# Patient Record
Sex: MALE | Race: WHITE | NOT HISPANIC OR LATINO | Employment: FULL TIME | URBAN - METROPOLITAN AREA
[De-identification: names, ages, dates, MRNs, and addresses within clinical notes are randomized per-mention and may not be internally consistent; named-entity substitution may affect disease eponyms.]

---

## 2017-03-28 ENCOUNTER — NUTRITION (OUTPATIENT)
Dept: DIABETES | Facility: CLINIC | Age: 62
End: 2017-03-28
Payer: COMMERCIAL

## 2017-03-28 VITALS — BODY MASS INDEX: 32.59 KG/M2 | WEIGHT: 276 LBS | HEIGHT: 77 IN

## 2017-03-28 DIAGNOSIS — Z71.3 NUTRITIONAL COUNSELING: Primary | ICD-10-CM

## 2017-03-28 PROCEDURE — 99999 PR PBB SHADOW E&M-EST. PATIENT-LVL III: CPT | Mod: PBBFAC,,, | Performed by: DIETITIAN, REGISTERED

## 2017-03-28 NOTE — MR AVS SNAPSHOT
"    [facility]  [address] [facility] [place] [address]  Phone: [phone]  Fax: [phone]                  Donavan Castillo   3/28/2017 3:30 PM   Nutrition    Description:  Male : 1955   Provider:  Yareli Carballo RD, CDE   Department:  [facility]           Reason for Visit     Ideal Protein           Diagnoses this Visit        Comments    Nutritional counseling    -  Primary            To Do List           Goals (5 Years of Data)     None      Follow-Up and Disposition     Return in about 3 months (around 2017), or RD 3mos.      [facility]     [facility] On Call Nurse Care Line -  Assistance  Registered nurses in the [facility] On Call Center provide clinical advisement, health education, appointment booking, and other advisory services.  Call for this free service at [phone].             Medications           STOP taking these medications     diltiazem (CARDIZEM) 120 MG tablet Take 240 mg by mouth.     tamsulosin (FLOMAX) 0.4 mg Cp24 0.4 mg.           Verify that the below list of medications is an accurate representation of the medications you are currently taking.  If none reported, the list may be blank. If incorrect, please contact your healthcare provider. Carry this list with you in case of emergency.           Current Medications     aspirin 325 MG tablet Take 325 mg by mouth.    diltiaZEM (CARDIZEM CD) 240 MG 24 hr capsule TAKE 1 CAPSULE BY MOUTH DAILY AT BEDTIME    flecainide (TAMBOCOR) 100 MG Tab Take 150 mg by mouth every 12 (twelve) hours.     ibuprofen 200 mg Cap Take 200 mg by mouth.    oxybutynin (DITROPAN XL) 10 MG 24 hr tablet Take 10 mg by mouth.           Clinical Reference Information           Your Vitals Were     Height Weight BMI          6' 5" (1.956 m) 125.2 kg (276 lb 0.3 oz) 32.73 kg/m2        Allergies as of 3/28/2017     No Known Allergies      Immunizations Administered on Date of Encounter - 3/28/2017     None    "   [facility] Sign-Up     Activating your [facility] account is as easy as 1-2-3!     1) Visit [email or web address], select Sign Up Now, enter this activation code and your date of birth, then select Next.  [email or web address]  Expires: 5/12/2017  3:47 PM      2) Create a username and password to use when you visit [facility] in the future and select a security question in case you lose your password and select Next.    3) Enter your e-mail address and click Sign Up!    Additional Information  If you have questions, please e-mail [email or web address] or call [phone] to talk to our [facility] staff. Remember, [facility] is NOT to be used for urgent needs. For medical emergencies, dial 911.         Language Assistance Services     ATTENTION: Language assistance services are available, free of charge. Please call [phone].      ATENCIÓN: Si habla iona, tiene a pace disposición servicios gratuitos de asistencia lingüística. Llame al [phone].     CHÚ Ý: N?u b?n nói Ti?ng Vi?t, có các d?ch v? h? tr? ngôn ng? mi?n phí dành cho b?n. G?i s? [phone].         [facility] complies with applicable Federal civil rights laws and does not discriminate on the basis of race, color, national origin, age, disability, or sex.

## 2017-03-28 NOTE — PROGRESS NOTES
IDEAL PROTEIN PROTOCOL  Weight Management Program     CC: Ideal Protein Protocol FU - initiate level 4 maintenance  PRIMARY PROVIDER:  Primary Doctor No   REFERRING PROVIDER: No ref. provider found     MEASUREMENTS: Reviewed from MA vitals.  BODY COMPOSITION: Reviewed from MA vitals.    GENERAL BODY FAT %:   CLASSIFICATION------WOMEN------MEN  Essential Fat----------------10-12%--------2-4%  Athletes--------------------14-20%--------6-13%  Fitness----------------------21-24%-------14-17%  Acceptable-----------------25-31%-------18-25%  At Risk---------------------->32%---------->25%    ROS:   Constipation: none   Hunger:  none   Weakness:  none   Fatigue: none   Dizziness:  none   Nausea: none     SELF-MONITORING: Glucometer - n/a; Food - none are avail    ACTIVITY LEVEL: biking couple days per week - inconsistent freq    NUTRITION RECALL:  Intake ~2200 cals/d w/ excess refined carb, fat and sodium from processed foods, dining out. Inadequate fruit, non-starchy vegetables, whole grains.  Bfst: cereal (raisin bran)  Lunch: 2pc pizza  Snack: lindsey's bar    Dinner: bbq pork on bun w/ chips  Nathalie: water, coffee    PSYCHOSOCIAL: Stage of change - preparation; Barriers to change - environmental, readiness    MNT ASSESSMENT:   1800 cals per day, 6-8oz protein per day, 195 grams carb per day  30-45 grm carb/meal, 15-30 grm carb/snack  Low-fat  150 min exercise per week    PLAN:   Reviewed MNT guidelines for obesity.    Encouraged daily self-monitoring of food and activity patterns, return records to clinic. Listed Origin[employer] barrett w/ discussion on benefits and applications to long-term wt maintenance.    Provided meal-planning instruction via foodlists, plate method, food models. Reviewed micro/macronutrient effect on health management. Discussed carbohydrate counting and spacing techniques with emphasis on cardiovascular wellness health strategies, functional foods. Reviewed principles of energy metabolism to assist  weight and health management. Encouraged pt to submit Ideal Protein food orders for ongoing progress.    Discussed importance of daily physical activity with review of benefits, methods, guidelines and precautions.    Discussed behavioral strategies for improving social and environmental support of lifestyle changes.    GOALS: Self-monitoring: Daily food & activity journal. Meal Plan: 90% Accuracy. Activity:150 min/wk.    Visit Time Spent:  30 minutes  Thank you for the opportunity to work with your patient.

## 2017-07-11 ENCOUNTER — NUTRITION (OUTPATIENT)
Dept: DIABETES | Facility: CLINIC | Age: 62
End: 2017-07-11
Payer: COMMERCIAL

## 2017-07-11 VITALS — BODY MASS INDEX: 33.53 KG/M2 | WEIGHT: 283.94 LBS | HEIGHT: 77 IN

## 2017-07-11 DIAGNOSIS — Z71.3 NUTRITIONAL COUNSELING: Primary | ICD-10-CM

## 2017-07-11 PROCEDURE — 99999 PR PBB SHADOW E&M-EST. PATIENT-LVL III: CPT | Mod: PBBFAC,,, | Performed by: DIETITIAN, REGISTERED

## 2017-07-11 NOTE — PROGRESS NOTES
IDEAL PROTEIN PROTOCOL  Weight Management Program     CC: Ideal Protein Protocol FU - Pt with ~20 lb wt gain since prior yr.     PRIMARY PROVIDER:  Primary Doctor No   REFERRING PROVIDER: No ref. provider found     MEASUREMENTS: Reviewed from MA Flying Pig Digitals.  BODY COMPOSITION: Reviewed from MA Flying Pig Digitals.    GENERAL BODY FAT %:   CLASSIFICATION------WOMEN------MEN  Essential Fat----------------10-12%--------2-4%  Athletes--------------------14-20%--------6-13%  Fitness----------------------21-24%-------14-17%  Acceptable-----------------25-31%-------18-25%  At Risk---------------------->32%---------->25%    SELF-MONITORING: none    ACTIVITY LEVEL: none regular, plans to start bike riding and elliptical with wife in evenings after work    NUTRITION RECALL:  Intake ~2600-3000cals/d w/ excess carb, fat and sodium from processed foods (snacks), dining out, portions and daily wine intake (~10-12 oz). Inadequate volume fruit, non-starchy vegetables, whole grains.    PSYCHOSOCIAL: Stage of change - lapses; Barriers to change - environmental, readiness.    MNT ASSESSMENT:   2657-2649 cals,  grm protein daily   Low-fat  150 min exercise per week    PLAN:   Reviewed MNT guidelines for obesity and long-term weight management.   Encouraged daily self-monitoring of food and activity patterns via Center'd or Revokomnesspal. Discussed importance of setting weight target and daily/weekly monitoring to improve awareness of progress.    Provided meal-planning instruction. Reviewed micro/macronutrient effect on health management. Discussed carbohydrate counting and spacing techniques with emphasis on cardiovascular wellness health strategies, functional foods. Reviewed principles of energy metabolism to assist weight and health management. Provided additional meal replacement product options. Emphasis on higher protein intake at lunch (6-8oz prot with 2+cups non-starchy vegetables) to assist with satiety and reduce grazing snack pattern  in pm. Emphasis on caloric intake of ethol and amt cals to gain 1lb per week.     Discussed importance of daily physical activity with review of benefits, methods, guidelines and precautions. Encouraged plan and consistency.     Discussed behavioral strategies for improving social and environmental support of lifestyle changes.    GOALS: Self-monitoring: Daily food & activity journal. Meal Plan: 90% Accuracy. Activity:150 min/wk.    Visit Time Spent:  30 minutes  Thank you for the opportunity to work with your patient.

## 2017-09-14 ENCOUNTER — NUTRITION (OUTPATIENT)
Dept: NUTRITION | Facility: CLINIC | Age: 62
End: 2017-09-14

## 2017-09-14 VITALS — BODY MASS INDEX: 33.36 KG/M2 | WEIGHT: 281.31 LBS

## 2020-08-06 ENCOUNTER — OFFICE VISIT (OUTPATIENT)
Dept: PSYCHIATRY | Facility: CLINIC | Age: 65
End: 2020-08-06
Payer: COMMERCIAL

## 2020-08-06 DIAGNOSIS — F34.1 DYSTHYMIA: Primary | ICD-10-CM

## 2020-08-06 DIAGNOSIS — R41.840 INATTENTION: ICD-10-CM

## 2020-08-06 PROCEDURE — 90792 PR PSYCHIATRIC DIAGNOSTIC EVALUATION W/MEDICAL SERVICES: ICD-10-PCS | Mod: S$GLB,,, | Performed by: PSYCHIATRY & NEUROLOGY

## 2020-08-06 PROCEDURE — 99999 PR PBB SHADOW E&M-EST. PATIENT-LVL I: ICD-10-PCS | Mod: PBBFAC,,, | Performed by: PSYCHIATRY & NEUROLOGY

## 2020-08-06 PROCEDURE — 90792 PSYCH DIAG EVAL W/MED SRVCS: CPT | Mod: S$GLB,,, | Performed by: PSYCHIATRY & NEUROLOGY

## 2020-08-06 PROCEDURE — 99999 PR PBB SHADOW E&M-EST. PATIENT-LVL I: CPT | Mod: PBBFAC,,, | Performed by: PSYCHIATRY & NEUROLOGY

## 2020-08-06 RX ORDER — ATOMOXETINE 40 MG/1
CAPSULE ORAL
Qty: 60 CAPSULE | Refills: 2 | Status: SHIPPED | OUTPATIENT
Start: 2020-08-06 | End: 2020-08-07 | Stop reason: SDUPTHER

## 2020-08-06 NOTE — PROGRESS NOTES
"Outpatient Psychiatry Initial Visit (MD/NP)    2020    Donavan Castillo, a 65 y.o. male, presenting for initial evaluation visit. Met with patient.    Reason for Encounter: Patient complains of chronic inattention.     History of Present Illness: Patient is a 66 y/o M with previous reported treatment for adhd, treated with stimulants since his mid-40's. Diagnosed with adhd by psychiatrist Kamari Wilson, previously treated with stimulants for many years. Reports chronic inattention interferes were personal function    Interferes with personal (marital conflict due to chronic problems listening, missing information from his wife) and occupational functioning (difficulty completing projects, being inefficient with his performance). Describes difficulty with multi-tasking, being easily distractible, not attending consistently through conversations. Thinks it may have gotten worse over past 10 years. Denies presence of problems with inattention during school-age years. Denies problems with anxiety, depression, generally, though frustrated with this particular problem. Participating in couples therapy.     Some days:   Low mood  Sleep problems  Self-reproach  Decreased interest     Most days:   Low appetite  Concentration problems    Nearly daily:   Anergia  Psychomotor slowing    PHQ-9 = 14    ASRS - 7 of 18 in clinical range    MOCA - 26 (point off for not completing one final edge of 3-d cube and 3 words not recalled on MOCA, though was able to recall 2/3 with cues).     Family Hx: "lots of eccentric people". Brother ( in '02) - severe OCD.  of overdose (suspected intentional). Sister, 2 years younger, "a hoarder". "the rest of us are functional but eccentric".     Psych Hx:   first problems with attention/concentration 15 years ago. Causes problems with completing tasks. Mostly stable over that time. Sometimes "walk into a room and realize I've forgotten what I went for".     diagnosed about 15 years ago. " "Diagnosed by Dr. Wilson.     Ritalin - worked well - took for a couple of years. 5 mg qid  adderall - didn't tolerate (mood swings)  Stopped due to atrial fibrillation - has had it on/off since , usually well-managed with medication, though has had cardioversion 2-3 times. Last cardioversion was in February and was about to consent to ablation, but got reluctant when COVID arrived.     Reports moods are sometimes low, never suicidal. Never treated for depression.   No rona - "a low energy person my whole life"  AVH - none  No delusions  No psych hospitalizations  Briefly took an antidepressant in  - prescribed by cardiologist - stopped due to no perceived benefit.       Medical Hx:   HTN  A-fib - managed with fleccanide  BPH - takes finasteride  mobic for joint pain.     Social Hx: 5 siblings, brother who  in '02, sister with problem with hoarding, another sister, 2 brothers (one in , one in [place]). Oldest of the 6. Both parents were in the home as were his maternal grandparents. Grew up in [place]. Childhood was a happy. Denies serious maltreatment. No developmental problems. Denies problems with attention & concentration as a kid. 2nd in class in . Went to [employer], studied pre-med, but didn't get accepted. 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 Continues to work full-time.      at 24 x 17 years. She had bipolar disorder.  again at 43 to second wife. Marriage is "stefano". Attention problem contributes to problems ("I don't listen the way she wants me too").        Review Of Systems:     GENERAL:  No weight gain or loss  SKIN:  No rashes or lacerations  HEAD:  No headaches  EYES:  No exophthalmos, jaundice or blindness  EARS:  No dizziness, tinnitus or hearing loss  NOSE:  No changes in smell  MOUTH & THROAT:  No dyskinetic movements or obvious goiter  CHEST:  No shortness of breath, " hyperventilation or cough  CARDIOVASCULAR:  No tachycardia or chest pain  ABDOMEN:  No nausea, vomiting, pain, constipation or diarrhea  URINARY:  No frequency, dysuria or sexual dysfunction  ENDOCRINE:  No polydipsia, polyuria  MUSCULOSKELETAL:  No pain or stiffness of the joints  NEUROLOGIC:  No weakness, sensory changes, seizures, confusion, memory loss, tremor or other abnormal movements    Current Evaluation:     Nutritional Screening: Considering the patient's height and weight, medications, medical history and preferences, should a referral be made to the dietitian? no    Constitutional  Vitals:  Most recent vital signs, dated less than 90 days prior to this appointment, were not reviewed.       General:  unremarkable, age appropriate     Musculoskeletal  Muscle Strength/Tone:  no tremor, no tic   Gait & Station:  non-ataxic     Psychiatric  Appearance: casually dressed & groomed;   Behavior: calm,   Cooperation: cooperative with assessment  Speech: normal rate, volume, tone  Thought Process: linear, goal-directed  Thought Content: No suicidal or homicidal ideation; no delusions  Affect: normal range  Mood: euthymic  Perceptions: No auditory or visual hallucinations  Level of Consciousness: alert throughout interview  Insight: fair  Cognition: Oriented to person, place, time, & situation  Memory: no apparent deficits to general clinical interview; not formally assessed  Attention/Concentration: no apparent deficits to general clinical interview; not formally assessed  Fund of Knowledge: average by vocabulary/education    Laboratory Data  No visits with results within 1 Month(s) from this visit.   Latest known visit with results is:   No results found for any previous visit.       Medications  Outpatient Encounter Medications as of 8/6/2020   Medication Sig Dispense Refill    aspirin 325 MG tablet Take 325 mg by mouth.      diltiaZEM (CARDIZEM CD) 240 MG 24 hr capsule TAKE 1 CAPSULE BY MOUTH DAILY AT BEDTIME       flecainide (TAMBOCOR) 100 MG Tab Take 150 mg by mouth every 12 (twelve) hours.       ibuprofen 200 mg Cap Take 200 mg by mouth.      oxybutynin (DITROPAN XL) 10 MG 24 hr tablet Take 15 mg by mouth.        No facility-administered encounter medications on file as of 8/6/2020.        Assessment - Diagnosis - Goals:     Impression: 64 y/o M with reportedly stable inattention, treated effectively with stimulants chronically. Has chronic mildly low moods, anergia,     DX: dysthymia; inattention    Treatment Goals:  Specify outcomes written in observable, behavioral terms: improve moods by self       Treatment Plan/Recommendations:   · Trial of strattera.   · Discussed risks, benefits, and alternatives to treatment plan documented above with patient. I answered all patient questions related to this plan and patient expressed understanding and agreement.      Return to Clinic: 2 months    Counseling time: 10 minutes  Total time: 50 minutes    CHARLOTTE Langston MD  Psychiatry  [facility]  [address] , [place] [address]  [phone]

## 2020-08-07 RX ORDER — ATOMOXETINE 40 MG/1
CAPSULE ORAL
Qty: 60 CAPSULE | Refills: 2 | Status: SHIPPED | OUTPATIENT
Start: 2020-08-07 | End: 2020-10-12

## 2020-08-10 ENCOUNTER — TELEPHONE (OUTPATIENT)
Dept: PHARMACY | Facility: CLINIC | Age: 65
End: 2020-08-10

## 2020-08-10 NOTE — TELEPHONE ENCOUNTER
The prior authorization for Donavan Ortega has been submitted on 8/10/2020 @ 3:42pm.  It can take up to 72 hours for a decision to be rendered from the insurance.  Patient is aware of PA and process.  Please let me know if you have any questions.    Thank You!   Nikkie Bazan CPhT, B.A  Patient Care Advocate   [facility]  Phone: [phone] Ext [phone]  Fax: [phone]

## 2020-08-10 NOTE — TELEPHONE ENCOUNTER
Good Afternoon,     The prior authorization for Donavan Castillo's Strattera prescription has been APPROVED FROM 8/10/2020 TO 8/10/2023 with copayment of $10.00.       Patient has been notified of the decision on 8/10/2020 and patient states he will  medicationat [facility] today.    If there are any additional questions or concerns, please contact me.    Thank You!   Nikkie Bazan CPhT, B.A  Patient Care Advocate   [facility]  Phone: [phone] Ext [phone]  Fax: [phone]

## 2020-10-12 ENCOUNTER — OFFICE VISIT (OUTPATIENT)
Dept: PSYCHIATRY | Facility: CLINIC | Age: 65
End: 2020-10-12
Payer: COMMERCIAL

## 2020-10-12 DIAGNOSIS — F34.1 DYSTHYMIA: Primary | ICD-10-CM

## 2020-10-12 DIAGNOSIS — R41.840 INATTENTION: ICD-10-CM

## 2020-10-12 PROCEDURE — 1159F MED LIST DOCD IN RCRD: CPT | Mod: S$GLB,,, | Performed by: PSYCHIATRY & NEUROLOGY

## 2020-10-12 PROCEDURE — 99999 PR PBB SHADOW E&M-EST. PATIENT-LVL I: ICD-10-PCS | Mod: PBBFAC,,, | Performed by: PSYCHIATRY & NEUROLOGY

## 2020-10-12 PROCEDURE — 99214 PR OFFICE/OUTPT VISIT, EST, LEVL IV, 30-39 MIN: ICD-10-PCS | Mod: S$GLB,,, | Performed by: PSYCHIATRY & NEUROLOGY

## 2020-10-12 PROCEDURE — 99999 PR PBB SHADOW E&M-EST. PATIENT-LVL I: CPT | Mod: PBBFAC,,, | Performed by: PSYCHIATRY & NEUROLOGY

## 2020-10-12 PROCEDURE — 99214 OFFICE O/P EST MOD 30 MIN: CPT | Mod: S$GLB,,, | Performed by: PSYCHIATRY & NEUROLOGY

## 2020-10-12 PROCEDURE — 1159F PR MEDICATION LIST DOCUMENTED IN MEDICAL RECORD: ICD-10-PCS | Mod: S$GLB,,, | Performed by: PSYCHIATRY & NEUROLOGY

## 2020-10-12 RX ORDER — GUANFACINE 1 MG/1
TABLET, EXTENDED RELEASE ORAL
Qty: 60 TABLET | Refills: 2 | Status: SHIPPED | OUTPATIENT
Start: 2020-10-12 | End: 2020-10-19 | Stop reason: SDUPTHER

## 2020-10-12 NOTE — PROGRESS NOTES
"Outpatient Psychiatry Follow-up Visit (MD/NP)    10/12/2020    Donavan Castillo, a 65 y.o. male, presenting for follow-up visit. Met with patient.    Reason for Encounter: Patient complains of chronic inattention.     Interval hx: Patient seen and interviewed for follow-up, about 2 months since last visit. This   Tolerated atomoxetine at 40 mg daily. Hasn't tolerated the 80 mg dose, however  (lightheadedness, constipation). Health is generally ok. No new health problems.   No new medications since last visit.     Background: Pt is a 64 y/o M with previous reported treatment for adhd, treated with stimulants since his mid-40's. Diagnosed with adhd by psychiatrist Kamari Wilson, previously treated with stimulants for many years. Reports chronic inattention interferes were personal function.     Interferes with personal (marital conflict due to chronic problems listening, missing information from his wife) and occupational functioning (difficulty completing projects, being inefficient with his performance). Describes difficulty with multi-tasking, being easily distractible, not attending consistently through conversations. Thinks it may have gotten worse over past 10 years. Denies presence of problems with inattention during school-age years. Denies problems with anxiety, depression, generally, though frustrated with this particular problem. Participating in couples therapy.     Some days:   Low mood  Sleep problems  Self-reproach  Decreased interest     Most days:   Low appetite  Concentration problems    Nearly daily:   Anergia  Psychomotor slowing    PHQ-9 = 14    ASRS - 7 of 18 in clinical range    MOCA - 26 (point off for not completing one final edge of 3-d cube and 3 words not recalled on MOCA, though was able to recall 2/3 with cues).     Family Hx: "lots of eccentric people". Brother ( in '02) - severe OCD.  of overdose (suspected intentional). Sister, 2 years younger, "a hoarder". "the rest of us are " "functional but eccentric".     Psych Hx:   first problems with attention/concentration 15 years ago. Causes problems with completing tasks. Mostly stable over that time. Sometimes "walk into a room and realize I've forgotten what I went for".     diagnosed about 15 years ago. Diagnosed by Dr. Wilson.     Ritalin - worked well - took for a couple of years. 5 mg qid  adderall - didn't tolerate (mood swings)  Stopped due to a-fib    Stopped due to atrial fibrillation - has had it on/off since , usually well-managed with medication, though has had cardioversion 2-3 times. Last cardioversion was in February and was about to consent to ablation, but got reluctant when COVID arrived.     Reports moods are sometimes low, never suicidal.   Never treated for depression.   No rona - "a low energy person my whole life"  AVH - none  No delusions  No psych hospitalizations  Briefly took an antidepressant in  - prescribed by cardiologist - stopped due to no perceived benefit.       Medical Hx:   HTN  A-fib - managed with fleccanide  BPH - takes finasteride  mobic for joint pain.     Social Hx: 5 siblings, brother who  in ', sister with problem with hoarding, another sister, 2 brothers (one in , one in [place]). Oldest of the 6. Both parents were in the home as were his maternal grandparents. Grew up in [place]. Childhood was a happy. Denies serious maltreatment. No developmental problems. Denies problems with attention & concentration as a kid. 2nd in class in . Went to hospitals, studied pre-med, but didn't get accepted. 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 Continues to work full-time.      at 24 x 17 years. She had bipolar disorder.  again at 43 to second wife. Marriage is "stefano". Attention problem contributes to problems ("I don't listen the way she wants me too").        Review Of Systems: "     GENERAL:  No weight gain or loss  SKIN:  No rashes or lacerations  HEAD:  No headaches  EYES:  No exophthalmos, jaundice or blindness  EARS:  No dizziness, tinnitus or hearing loss  NOSE:  No changes in smell  MOUTH & THROAT:  No dyskinetic movements or obvious goiter  CHEST:  No shortness of breath, hyperventilation or cough  CARDIOVASCULAR:  No tachycardia or chest pain  ABDOMEN:  No nausea, vomiting, pain, constipation or diarrhea  URINARY:  No frequency, dysuria or sexual dysfunction  ENDOCRINE:  No polydipsia, polyuria  MUSCULOSKELETAL:  No pain or stiffness of the joints  NEUROLOGIC:  No weakness, sensory changes, seizures, confusion, memory loss, tremor or other abnormal movements    Current Evaluation:     Nutritional Screening: Considering the patient's height and weight, medications, medical history and preferences, should a referral be made to the dietitian? no    Constitutional  Vitals:  Most recent vital signs, dated less than 90 days prior to this appointment, were not reviewed.       General:  unremarkable, age appropriate     Musculoskeletal  Muscle Strength/Tone:  no tremor, no tic   Gait & Station:  non-ataxic     Psychiatric  Appearance: casually dressed & groomed;   Behavior: calm,   Cooperation: cooperative with assessment  Speech: normal rate, volume, tone  Thought Process: linear, goal-directed  Thought Content: No suicidal or homicidal ideation; no delusions  Affect: normal range  Mood: euthymic  Perceptions: No auditory or visual hallucinations  Level of Consciousness: alert throughout interview  Insight: fair  Cognition: Oriented to person, place, time, & situation  Memory: no apparent deficits to general clinical interview; not formally assessed  Attention/Concentration: no apparent deficits to general clinical interview; not formally assessed  Fund of Knowledge: average by vocabulary/education    Laboratory Data  No visits with results within 1 Month(s) from this visit.   Latest known  visit with results is:   No results found for any previous visit.       Medications  Outpatient Encounter Medications as of 10/12/2020   Medication Sig Dispense Refill    aspirin 325 MG tablet Take 325 mg by mouth.      atomoxetine (STRATTERA) 40 MG capsule Take 1 tablet by mouth daily for 7 days then 2 daily thereafter 60 capsule 2    diltiaZEM (CARDIZEM CD) 240 MG 24 hr capsule TAKE 1 CAPSULE BY MOUTH DAILY AT BEDTIME      flecainide (TAMBOCOR) 100 MG Tab Take 150 mg by mouth every 12 (twelve) hours.       ibuprofen 200 mg Cap Take 200 mg by mouth.      oxybutynin (DITROPAN XL) 10 MG 24 hr tablet Take 15 mg by mouth.        No facility-administered encounter medications on file as of 10/12/2020.      Assessment - Diagnosis - Goals:     Impression: 64 y/o M with reportedly stable inattention, treated effectively with stimulants chronically. Has chronic mildly low moods, anergia,     DX: dysthymia; inattention    Treatment Goals:  Specify outcomes written in observable, behavioral terms: improve moods by self     Treatment Plan/Recommendations:   · Trial of intuniv.    · MOCA on follow-up.   · Discussed risks, benefits, and alternatives to treatment plan documented above with patient. I answered all patient questions related to this plan and patient expressed understanding and agreement.      Return to Clinic: 2 months    Counseling time: 10 minutes  Total time: 50 minutes    CHARLOTTE Langston MD  Psychiatry  [facility]  [address] , [place] [address]  [phone]

## 2020-10-19 ENCOUNTER — PATIENT MESSAGE (OUTPATIENT)
Dept: PSYCHIATRY | Facility: CLINIC | Age: 65
End: 2020-10-19

## 2020-10-19 RX ORDER — GUANFACINE 2 MG/1
TABLET, EXTENDED RELEASE ORAL
Qty: 30 TABLET | Refills: 2 | Status: SHIPPED | OUTPATIENT
Start: 2020-10-19 | End: 2020-12-11 | Stop reason: SDUPTHER

## 2020-10-19 RX ORDER — GUANFACINE 1 MG/1
TABLET, EXTENDED RELEASE ORAL
Qty: 7 TABLET | Refills: 0 | Status: SHIPPED | OUTPATIENT
Start: 2020-10-19 | End: 2020-12-11 | Stop reason: SDUPTHER

## 2020-12-11 ENCOUNTER — OFFICE VISIT (OUTPATIENT)
Dept: PSYCHIATRY | Facility: CLINIC | Age: 65
End: 2020-12-11
Payer: COMMERCIAL

## 2020-12-11 VITALS
BODY MASS INDEX: 32.44 KG/M2 | HEART RATE: 49 BPM | SYSTOLIC BLOOD PRESSURE: 145 MMHG | DIASTOLIC BLOOD PRESSURE: 77 MMHG | WEIGHT: 273.56 LBS

## 2020-12-11 DIAGNOSIS — R41.840 INATTENTION: ICD-10-CM

## 2020-12-11 DIAGNOSIS — F34.1 DYSTHYMIA: Primary | ICD-10-CM

## 2020-12-11 PROCEDURE — 99999 PR PBB SHADOW E&M-EST. PATIENT-LVL II: CPT | Mod: PBBFAC,,, | Performed by: PSYCHIATRY & NEUROLOGY

## 2020-12-11 PROCEDURE — 1159F MED LIST DOCD IN RCRD: CPT | Mod: S$GLB,,, | Performed by: PSYCHIATRY & NEUROLOGY

## 2020-12-11 PROCEDURE — 3008F BODY MASS INDEX DOCD: CPT | Mod: CPTII,S$GLB,, | Performed by: PSYCHIATRY & NEUROLOGY

## 2020-12-11 PROCEDURE — 99999 PR PBB SHADOW E&M-EST. PATIENT-LVL II: ICD-10-PCS | Mod: PBBFAC,,, | Performed by: PSYCHIATRY & NEUROLOGY

## 2020-12-11 PROCEDURE — 99214 PR OFFICE/OUTPT VISIT, EST, LEVL IV, 30-39 MIN: ICD-10-PCS | Mod: S$GLB,,, | Performed by: PSYCHIATRY & NEUROLOGY

## 2020-12-11 PROCEDURE — 1159F PR MEDICATION LIST DOCUMENTED IN MEDICAL RECORD: ICD-10-PCS | Mod: S$GLB,,, | Performed by: PSYCHIATRY & NEUROLOGY

## 2020-12-11 PROCEDURE — 3008F PR BODY MASS INDEX (BMI) DOCUMENTED: ICD-10-PCS | Mod: CPTII,S$GLB,, | Performed by: PSYCHIATRY & NEUROLOGY

## 2020-12-11 PROCEDURE — 99214 OFFICE O/P EST MOD 30 MIN: CPT | Mod: S$GLB,,, | Performed by: PSYCHIATRY & NEUROLOGY

## 2020-12-11 RX ORDER — GUANFACINE 2 MG/1
TABLET, EXTENDED RELEASE ORAL
Qty: 30 TABLET | Refills: 1 | Status: SHIPPED | OUTPATIENT
Start: 2020-12-11 | End: 2020-12-11

## 2020-12-11 RX ORDER — BUPROPION HYDROCHLORIDE 150 MG/1
TABLET ORAL
Qty: 60 TABLET | Refills: 2 | Status: SHIPPED | OUTPATIENT
Start: 2020-12-11 | End: 2021-03-04

## 2020-12-11 NOTE — PROGRESS NOTES
"Outpatient Psychiatry Follow-up Visit (MD/NP)    2020    Donavan Castillo, a 65 y.o. male, presenting for follow-up visit. Met with patient.    Reason for Encounter: Patient complains of chronic inattention.     Interval hx: Patient seen and interviewed for follow-up, about 2 months since last visit. Reports having noticed minimal to modest improvements to attention and concentration with most recent medication change. Tolerating medication well without side effects. Health is generally ok. No new health problems. No new medications since last visit.     Background: Pt is a 66 y/o M with previous reported treatment for adhd, treated with stimulants since his mid-40's. Diagnosed with adhd by psychiatrist Kamari Wilson, previously treated with stimulants for many years. Reports chronic inattention interferes were personal function.     Interferes with personal (marital conflict due to chronic problems listening, missing information from his wife) and occupational functioning (difficulty completing projects, being inefficient with his performance). Describes difficulty with multi-tasking, being easily distractible, not attending consistently through conversations. Thinks it may have gotten worse over past 10 years. Denies presence of problems with inattention during school-age years. Denies problems with anxiety, depression, generally, though frustrated with this particular problem. Participating in couples therapy.     Some days:   Low mood  Sleep problems  Self-reproach  Decreased interest     Most days:   Low appetite  Concentration problems    Nearly daily:   Anergia  Psychomotor slowing    PHQ-9 = 14    ASRS - 7 of 18 in clinical range    MOCA - 26 (point off for not completing one final edge of 3-d cube and 3 words not recalled on MOCA, though was able to recall 2/3 with cues).     Family Hx: "lots of eccentric people". Brother ( in '02) - severe OCD.  of overdose (suspected intentional). Sister, 2 " "years younger, "a hoarder". "the rest of us are functional but eccentric".     Psych Hx:   first problems with attention/concentration 15 years ago. Causes problems with completing tasks. Mostly stable over that time. Sometimes "walk into a room and realize I've forgotten what I went for".     diagnosed about 15 years ago. Diagnosed by Dr. Wilson.     Ritalin - worked well - took for a couple of years. 5 mg qid  adderall - didn't tolerate (mood swings)  Stopped due to a-fib    Stopped due to atrial fibrillation - has had it on/off since , usually well-managed with medication, though has had cardioversion 2-3 times. Last cardioversion was in February and was about to consent to ablation, but got reluctant when COVID arrived.     Reports moods are sometimes low, never suicidal.   Never treated for depression.   No rona - "a low energy person my whole life"  AVH - none  No delusions  No psych hospitalizations  Briefly took an antidepressant in  - prescribed by cardiologist - stopped due to no perceived benefit.       Medical Hx:   HTN  A-fib - managed with fleccanide  BPH - takes finasteride  mobic for joint pain.     Social Hx: 5 siblings, brother who  in '02, sister with problem with hoarding, another sister, 2 brothers (one in , one in [place]). Oldest of the 6. Both parents were in the home as were his maternal grandparents. Grew up in [place]. Childhood was a happy. Denies serious maltreatment. No developmental problems. Denies problems with attention & concentration as a kid. 2nd in class in . Went to [employer], studied pre-med, but didn't get accepted. 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 Continues to work full-time.      at 24 x 17 years. She had bipolar disorder.  again at 43 to second wife. Marriage is "stefano". Attention problem contributes to problems ("I don't listen the way " "she wants me too").        Review Of Systems:     GENERAL:  No weight gain or loss  SKIN:  No rashes or lacerations  HEAD:  No headaches  EYES:  No exophthalmos, jaundice or blindness  EARS:  No dizziness, tinnitus or hearing loss  NOSE:  No changes in smell  MOUTH & THROAT:  No dyskinetic movements or obvious goiter  CHEST:  No shortness of breath, hyperventilation or cough  CARDIOVASCULAR:  No tachycardia or chest pain  ABDOMEN:  No nausea, vomiting, pain, constipation or diarrhea  URINARY:  No frequency, dysuria or sexual dysfunction  ENDOCRINE:  No polydipsia, polyuria  MUSCULOSKELETAL:  No pain or stiffness of the joints  NEUROLOGIC:  No weakness, sensory changes, seizures, confusion, memory loss, tremor or other abnormal movements    Current Evaluation:     Nutritional Screening: Considering the patient's height and weight, medications, medical history and preferences, should a referral be made to the dietitian? no    Constitutional  Vitals:  Most recent vital signs, dated less than 90 days prior to this appointment, were not reviewed.       General:  unremarkable, age appropriate     Musculoskeletal  Muscle Strength/Tone:  no tremor, no tic   Gait & Station:  non-ataxic     Psychiatric  Appearance: casually dressed & groomed;   Behavior: calm,   Cooperation: cooperative with assessment  Speech: normal rate, volume, tone  Thought Process: linear, goal-directed  Thought Content: No suicidal or homicidal ideation; no delusions  Affect: normal range  Mood: euthymic  Perceptions: No auditory or visual hallucinations  Level of Consciousness: alert throughout interview  Insight: fair  Cognition: Oriented to person, place, time, & situation  Memory: no apparent deficits to general clinical interview; not formally assessed  Attention/Concentration: no apparent deficits to general clinical interview; not formally assessed  Fund of Knowledge: average by vocabulary/education    Laboratory Data  No visits with results " within 1 Month(s) from this visit.   Latest known visit with results is:   No results found for any previous visit.       Medications  Outpatient Encounter Medications as of 12/11/2020   Medication Sig Dispense Refill    aspirin 325 MG tablet Take 325 mg by mouth.      diltiaZEM (CARDIZEM CD) 240 MG 24 hr capsule TAKE 1 CAPSULE BY MOUTH DAILY AT BEDTIME      flecainide (TAMBOCOR) 100 MG Tab Take 150 mg by mouth every 12 (twelve) hours.       guanFACINE (INTUNIV ER) 1 mg Tb24 Take 1 daily for 7 days then 2 daily thereafter. 7 tablet 0    guanFACINE (INTUNIV ER) 2 mg Tb24 Take 2 mg daily after finishing 1 mg bottle. 30 tablet 2    ibuprofen 200 mg Cap Take 200 mg by mouth.      oxybutynin (DITROPAN XL) 10 MG 24 hr tablet Take 15 mg by mouth.        No facility-administered encounter medications on file as of 12/11/2020.      Assessment - Diagnosis - Goals:     Impression: 64 y/o M with reportedly stable inattention, treated effectively with stimulants chronically. Has chronic mildly low moods, anergia. Didn't tolerate strattera, intuniv with modest effect but tolerable at starting doses.     DX: dysthymia; inattention    Treatment Goals:  Specify outcomes written in observable, behavioral terms: improve moods by self     Treatment Plan/Recommendations:   · Trial of intuniv increase to 2 mg. Wellbutrin for augmentation.     · MOCA.  · Discussed risks, benefits, and alternatives to treatment plan documented above with patient. I answered all patient questions related to this plan and patient expressed understanding and agreement.      Return to Clinic: 2 months    Counseling time: 10 minutes  Total time: 50 minutes    CHARLOTTE Langston MD  Psychiatry  [facility]  [address] , [place] [address]  [phone]

## 2021-01-06 ENCOUNTER — PATIENT MESSAGE (OUTPATIENT)
Dept: PSYCHIATRY | Facility: CLINIC | Age: 66
End: 2021-01-06

## 2022-04-01 ENCOUNTER — OFFICE VISIT (OUTPATIENT)
Dept: PSYCHIATRY | Facility: CLINIC | Age: 67
End: 2022-04-01
Payer: COMMERCIAL

## 2022-04-01 DIAGNOSIS — F32.1 CURRENT MODERATE EPISODE OF MAJOR DEPRESSIVE DISORDER, UNSPECIFIED WHETHER RECURRENT: Primary | ICD-10-CM

## 2022-04-01 PROCEDURE — 99999 PR PBB SHADOW E&M-EST. PATIENT-LVL I: ICD-10-PCS | Mod: PBBFAC,,, | Performed by: PSYCHIATRY & NEUROLOGY

## 2022-04-01 PROCEDURE — 4010F ACE/ARB THERAPY RXD/TAKEN: CPT | Mod: CPTII,S$GLB,, | Performed by: PSYCHIATRY & NEUROLOGY

## 2022-04-01 PROCEDURE — 90833 PSYTX W PT W E/M 30 MIN: CPT | Mod: S$GLB,,, | Performed by: PSYCHIATRY & NEUROLOGY

## 2022-04-01 PROCEDURE — 99214 OFFICE O/P EST MOD 30 MIN: CPT | Mod: S$GLB,,, | Performed by: PSYCHIATRY & NEUROLOGY

## 2022-04-01 PROCEDURE — 90833 PR PSYCHOTHERAPY W/PATIENT W/E&M, 30 MIN (ADD ON): ICD-10-PCS | Mod: S$GLB,,, | Performed by: PSYCHIATRY & NEUROLOGY

## 2022-04-01 PROCEDURE — 99214 PR OFFICE/OUTPT VISIT, EST, LEVL IV, 30-39 MIN: ICD-10-PCS | Mod: S$GLB,,, | Performed by: PSYCHIATRY & NEUROLOGY

## 2022-04-01 PROCEDURE — 4010F PR ACE/ARB THEARPY RXD/TAKEN: ICD-10-PCS | Mod: CPTII,S$GLB,, | Performed by: PSYCHIATRY & NEUROLOGY

## 2022-04-01 PROCEDURE — 99999 PR PBB SHADOW E&M-EST. PATIENT-LVL I: CPT | Mod: PBBFAC,,, | Performed by: PSYCHIATRY & NEUROLOGY

## 2022-04-01 RX ORDER — SERTRALINE HYDROCHLORIDE 100 MG/1
TABLET, FILM COATED ORAL
Qty: 30 TABLET | Refills: 2 | Status: SHIPPED | OUTPATIENT
Start: 2022-04-01 | End: 2022-06-10 | Stop reason: SDUPTHER

## 2022-04-01 NOTE — PROGRESS NOTES
"Outpatient Psychiatry Follow-up Visit (MD/NP)    4/1/2022    Donavan Castillo, a 66 y.o. male, presenting for follow-up visit. Met with patient.    Reason for Encounter: Patient complains of chronic inattention.     Interval hx: Patient seen and interviewed for follow-up, about 2 months since last visit. Endorses decreased interest most days, tired almost daily, psychomotor almost daily, problems with concentration most days, depressed mood less than half the days, self reporach some days. No SI. Feeling anxious, overworrying, irritable less than half of days. ESVIN-7 = 4. PHQ-9 = 15. "Not a lot of kirby", conflict with wife and he senses that relationship problems are result of his mental health issues rather than the cause.  strattera was +/- benefit; intuniv -; didn't try bupropion (warned by pharmacist about risks). MOCA today - 29 - 1 point deducted for visuospatial construction problem with cube draw. Improvement from previous testing (26 at last testing).     Reports having noticed minimal to modest improvements to attention and concentration with most recent medication change. Tolerating medication well without side effects. Health is generally ok. No new health problems. No new medications since last visit.     Background: Pt is a 66 y/o M with previous reported treatment for adhd, treated with stimulants since his mid-40's. Diagnosed with adhd by psychiatrist Kamari Wilson, previously treated with stimulants for many years. Reports chronic inattention interferes were personal function.     Interferes with personal (marital conflict due to chronic problems listening, missing information from his wife) and occupational functioning (difficulty completing projects, being inefficient with his performance). Describes difficulty with multi-tasking, being easily distractible, not attending consistently through conversations. Thinks it may have gotten worse over past 10 years. Denies presence of problems with " "inattention during school-age years. Denies problems with anxiety, depression, generally, though frustrated with this particular problem. Participating in couples therapy.     Some days:   Low mood  Sleep problems  Self-reproach  Decreased interest     Most days:   Low appetite  Concentration problems    Nearly daily:   Anergia  Psychomotor slowing    PHQ-9 = 14    ASRS - 7 of 18 in clinical range    MOCA - 26 (point off for not completing one final edge of 3-d cube and 3 words not recalled on MOCA, though was able to recall 2/3 with cues).     Family Hx: "lots of eccentric people". Brother ( in ) - severe OCD.  of overdose (suspected intentional). Sister, 2 years younger, "a hoarder". "the rest of us are functional but eccentric".     Psych Hx:   first problems with attention/concentration 15 years ago. Causes problems with completing tasks. Mostly stable over that time. Sometimes "walk into a room and realize I've forgotten what I went for".     diagnosed about 15 years ago. Diagnosed by Dr. Wilson.     Ritalin - worked well - took for a couple of years. 5 mg qid  adderall - didn't tolerate (mood swings)  Stopped due to a-fib    Stopped due to atrial fibrillation - has had it on/off since , usually well-managed with medication, though has had cardioversion 2-3 times. Last cardioversion was in February and was about to consent to ablation, but got reluctant when COVID arrived.     Reports moods are sometimes low, never suicidal.   Never treated for depression.   No rona - "a low energy person my whole life"  AVH - none  No delusions  No psych hospitalizations  Briefly took an antidepressant in  - prescribed by cardiologist - stopped due to no perceived benefit.       Medical Hx:   HTN  A-fib - managed with fleccanide  BPH - takes finasteride  mobic for joint pain.     Social Hx: 5 siblings, brother who  in , sister with problem with hoarding, another sister, 2 brothers (one in [place], one " "in [place]). Oldest of the 6. Both parents were in the home as were his maternal grandparents. Grew up in [place]. Childhood was a happy. Denies serious maltreatment. No developmental problems. Denies problems with attention & concentration as a kid. 2nd in class in . Went to [employer], studied pre-med, but didn't get accepted. 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 Continues to work full-time.      at 24 x 17 years. She had bipolar disorder.  again at 43 to second wife. Marriage is "stefano". Attention problem contributes to problems ("I don't listen the way she wants me too").      Review Of Systems:     GENERAL:  No weight gain or loss  SKIN:  No rashes or lacerations  HEAD:  No headaches  CARDIOVASCULAR:  No tachycardia or chest pain  ABDOMEN:  No nausea, vomiting, pain, constipation or diarrhea  URINARY:  No frequency, dysuria or sexual dysfunction  ENDOCRINE:  No polydipsia, polyuria  MUSCULOSKELETAL:  No pain or stiffness of the joints  NEUROLOGIC:  No weakness, sensory changes, seizures, confusion, memory loss, tremor or other abnormal movements    Current Evaluation:     Nutritional Screening: Considering the patient's height and weight, medications, medical history and preferences, should a referral be made to the dietitian? no    Constitutional  Vitals:  Most recent vital signs, dated less than 90 days prior to this appointment, were not reviewed.       General:  unremarkable, age appropriate     Musculoskeletal  Muscle Strength/Tone:  no tremor, no tic   Gait & Station:  non-ataxic     Psychiatric  Appearance: casually dressed & groomed;   Behavior: calm,   Cooperation: cooperative with assessment  Speech: normal rate, volume, tone  Thought Process: linear, goal-directed  Thought Content: No suicidal or homicidal ideation; no delusions  Affect: normal range  Mood: euthymic  Perceptions: No " auditory or visual hallucinations  Level of Consciousness: alert throughout interview  Insight: fair  Cognition: Oriented to person, place, time, & situation  Memory: no apparent deficits to general clinical interview; not formally assessed  Attention/Concentration: no apparent deficits to general clinical interview; not formally assessed  Fund of Knowledge: average by vocabulary/education    Laboratory Data  No visits with results within 1 Month(s) from this visit.   Latest known visit with results is:   No results found for any previous visit.       Medications  Outpatient Encounter Medications as of 4/1/2022   Medication Sig Dispense Refill    aspirin 325 MG tablet Take 325 mg by mouth.      buPROPion (WELLBUTRIN XL) 150 MG TB24 tablet TAKE 1 DAILY FOR 7 DAYS THEN 2 DAILY THEREAFTER 180 tablet 0    diltiaZEM (CARDIZEM CD) 240 MG 24 hr capsule TAKE 1 CAPSULE BY MOUTH DAILY AT BEDTIME      flecainide (TAMBOCOR) 100 MG Tab Take 150 mg by mouth every 12 (twelve) hours.       guanFACINE (INTUNIV ER) 2 mg Tb24 TAKE 2 MG DAILY AFTER FINISHING 1 MG BOTTLE. 90 tablet 0    ibuprofen 200 mg Cap Take 200 mg by mouth.      oxybutynin (DITROPAN XL) 10 MG 24 hr tablet Take 15 mg by mouth.        No facility-administered encounter medications on file as of 4/1/2022.     Assessment - Diagnosis - Goals:     Impression: 66 y/o M with reportedly stable inattention, treated effectively with stimulants chronically. Has chronic mildly low moods, anergia. Didn't tolerate strattera, intuniv with modest effect but tolerable at starting doses. MOCA on follow-up was improved and in normal range.     DX: MDD; inattention    Treatment Goals:  Specify outcomes written in observable, behavioral terms: improve moods by self     Treatment Plan/Recommendations:   · Sertraline for depression.   · Supportive psychotherapy today. Consider regular psychotherapy.     · Discussed risks, benefits, and alternatives to treatment plan documented  above with patient. I answered all patient questions related to this plan and patient expressed understanding and agreement.      Return to Clinic: 2 months    Counseling time: 10 minutes  Total time: 50 minutes    CHARLOTTE Langston MD  Psychiatry  [facility]  [address] , [place] [address]  [phone]

## 2022-06-10 ENCOUNTER — OFFICE VISIT (OUTPATIENT)
Dept: PSYCHIATRY | Facility: CLINIC | Age: 67
End: 2022-06-10
Payer: COMMERCIAL

## 2022-06-10 VITALS
SYSTOLIC BLOOD PRESSURE: 143 MMHG | DIASTOLIC BLOOD PRESSURE: 79 MMHG | BODY MASS INDEX: 32.21 KG/M2 | WEIGHT: 271.63 LBS | HEART RATE: 50 BPM

## 2022-06-10 DIAGNOSIS — F33.41 MDD (MAJOR DEPRESSIVE DISORDER), RECURRENT, IN PARTIAL REMISSION: Primary | ICD-10-CM

## 2022-06-10 PROCEDURE — 99214 OFFICE O/P EST MOD 30 MIN: CPT | Mod: S$GLB,,, | Performed by: PSYCHIATRY & NEUROLOGY

## 2022-06-10 PROCEDURE — 3008F PR BODY MASS INDEX (BMI) DOCUMENTED: ICD-10-PCS | Mod: CPTII,S$GLB,, | Performed by: PSYCHIATRY & NEUROLOGY

## 2022-06-10 PROCEDURE — 3078F DIAST BP <80 MM HG: CPT | Mod: CPTII,S$GLB,, | Performed by: PSYCHIATRY & NEUROLOGY

## 2022-06-10 PROCEDURE — 4010F ACE/ARB THERAPY RXD/TAKEN: CPT | Mod: CPTII,S$GLB,, | Performed by: PSYCHIATRY & NEUROLOGY

## 2022-06-10 PROCEDURE — 99214 PR OFFICE/OUTPT VISIT, EST, LEVL IV, 30-39 MIN: ICD-10-PCS | Mod: S$GLB,,, | Performed by: PSYCHIATRY & NEUROLOGY

## 2022-06-10 PROCEDURE — 3078F PR MOST RECENT DIASTOLIC BLOOD PRESSURE < 80 MM HG: ICD-10-PCS | Mod: CPTII,S$GLB,, | Performed by: PSYCHIATRY & NEUROLOGY

## 2022-06-10 PROCEDURE — 99999 PR PBB SHADOW E&M-EST. PATIENT-LVL III: CPT | Mod: PBBFAC,,, | Performed by: PSYCHIATRY & NEUROLOGY

## 2022-06-10 PROCEDURE — 3077F PR MOST RECENT SYSTOLIC BLOOD PRESSURE >= 140 MM HG: ICD-10-PCS | Mod: CPTII,S$GLB,, | Performed by: PSYCHIATRY & NEUROLOGY

## 2022-06-10 PROCEDURE — 4010F PR ACE/ARB THEARPY RXD/TAKEN: ICD-10-PCS | Mod: CPTII,S$GLB,, | Performed by: PSYCHIATRY & NEUROLOGY

## 2022-06-10 PROCEDURE — 99999 PR PBB SHADOW E&M-EST. PATIENT-LVL III: ICD-10-PCS | Mod: PBBFAC,,, | Performed by: PSYCHIATRY & NEUROLOGY

## 2022-06-10 PROCEDURE — 3008F BODY MASS INDEX DOCD: CPT | Mod: CPTII,S$GLB,, | Performed by: PSYCHIATRY & NEUROLOGY

## 2022-06-10 PROCEDURE — 3077F SYST BP >= 140 MM HG: CPT | Mod: CPTII,S$GLB,, | Performed by: PSYCHIATRY & NEUROLOGY

## 2022-06-10 RX ORDER — SERTRALINE HYDROCHLORIDE 100 MG/1
TABLET, FILM COATED ORAL
Qty: 45 TABLET | Refills: 2 | Status: SHIPPED | OUTPATIENT
Start: 2022-06-10 | End: 2022-06-10

## 2022-06-10 RX ORDER — SERTRALINE HYDROCHLORIDE 100 MG/1
150 TABLET, FILM COATED ORAL DAILY
Qty: 135 TABLET | Refills: 0 | Status: SHIPPED | OUTPATIENT
Start: 2022-06-10 | End: 2022-09-16

## 2022-06-10 NOTE — PROGRESS NOTES
"Outpatient Psychiatry Follow-up Visit (MD/NP)    6/10/2022    Donavan Castillo, a 67 y.o. male, presenting for follow-up visit. Met with patient.    Reason for Encounter: Patient complains of chronic inattention.     Interval hx: Patient seen and interviewed for follow-up, about 2 months since last visit. Endorses considerable improvement in moods since last visit. No new symptoms since last visit. Has had A-fib episode, has been on/off for years. Will have an ablation in July. Adherent to medication. Denies side effects. No new medications.     Background: Pt is a 64 y/o M with previous reported treatment for adhd, treated with stimulants since his mid-40's. Diagnosed with adhd by psychiatrist Kamari Wilson, previously treated with stimulants for many years. Reports chronic inattention interferes were personal function.     Interferes with personal (marital conflict due to chronic problems listening, missing information from his wife) and occupational functioning (difficulty completing projects, being inefficient with his performance). Describes difficulty with multi-tasking, being easily distractible, not attending consistently through conversations. Thinks it may have gotten worse over past 10 years. Denies presence of problems with inattention during school-age years. Denies problems with anxiety, depression, generally, though frustrated with this particular problem. Participating in couples therapy.     Some days:   Low mood  Sleep problems  Self-reproach  Decreased interest     Most days:   Low appetite  Concentration problems    Nearly daily:   Anergia  Psychomotor slowing    PHQ-9 = 14    ASRS - 7 of 18 in clinical range    MOCA - 26 (point off for not completing one final edge of 3-d cube and 3 words not recalled on MOCA, though was able to recall 2/3 with cues).     Family Hx: "lots of eccentric people". Brother ( in ) - severe OCD.  of overdose (suspected intentional). Sister, 2 years younger, " ""a hoarder". "the rest of us are functional but eccentric".     Psych Hx:   first problems with attention/concentration 15 years ago. Causes problems with completing tasks. Mostly stable over that time. Sometimes "walk into a room and realize I've forgotten what I went for".     diagnosed about 15 years ago. Diagnosed by Dr. Wilson.     Ritalin - worked well - took for a couple of years. 5 mg qid  adderall - didn't tolerate (mood swings)  Stopped due to a-fib    Stopped due to atrial fibrillation - has had it on/off since , usually well-managed with medication, though has had cardioversion 2-3 times. Last cardioversion was in February and was about to consent to ablation, but got reluctant when COVID arrived.     Reports moods are sometimes low, never suicidal.   Never treated for depression.   No rona - "a low energy person my whole life"  AVH - none  No delusions  No psych hospitalizations  Briefly took an antidepressant in  - prescribed by cardiologist - stopped due to no perceived benefit.       Medical Hx:   HTN  A-fib - managed with fleccanide  BPH - takes finasteride  mobic for joint pain.     Social Hx: 5 siblings, brother who  in '02, sister with problem with hoarding, another sister, 2 brothers (one in , one in [place]). Oldest of the 6. Both parents were in the home as were his maternal grandparents. Grew up in [place]. Childhood was a happy. Denies serious maltreatment. No developmental problems. Denies problems with attention & concentration as a kid. 2nd in class in . Went to [employer], studied pre-med, but didn't get accepted. 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      at 24 x 17 years. She had bipolar disorder.  again at 43 to second wife. Marriage is "stefano". Attention problem contributes to problems ("I don't listen the way she wants me " "too").      Review Of Systems:     GENERAL:  No weight gain or loss  SKIN:  No rashes or lacerations  HEAD:  No headaches  CARDIOVASCULAR:  No tachycardia or chest pain  ABDOMEN:  No nausea, vomiting, pain, constipation or diarrhea  URINARY:  No frequency, dysuria or sexual dysfunction  ENDOCRINE:  No polydipsia, polyuria  MUSCULOSKELETAL:  No pain or stiffness of the joints  NEUROLOGIC:  No weakness, sensory changes, seizures, confusion, memory loss, tremor or other abnormal movements    Current Evaluation:     Nutritional Screening: Considering the patient's height and weight, medications, medical history and preferences, should a referral be made to the dietitian? no    Constitutional  Vitals:  Most recent vital signs, dated less than 90 days prior to this appointment, were not reviewed.       General:  unremarkable, age appropriate     Musculoskeletal  Muscle Strength/Tone:  no tremor, no tic   Gait & Station:  non-ataxic     Psychiatric  Appearance: casually dressed & groomed;   Behavior: calm,   Cooperation: cooperative with assessment  Speech: normal rate, volume, tone  Thought Process: linear, goal-directed  Thought Content: No suicidal or homicidal ideation; no delusions  Affect: normal range  Mood: euthymic  Perceptions: No auditory or visual hallucinations  Level of Consciousness: alert throughout interview  Insight: fair  Cognition: Oriented to person, place, time, & situation  Memory: no apparent deficits to general clinical interview; not formally assessed  Attention/Concentration: no apparent deficits to general clinical interview; not formally assessed  Fund of Knowledge: average by vocabulary/education    Laboratory Data  No visits with results within 1 Month(s) from this visit.   Latest known visit with results is:   No results found for any previous visit.       Medications  Outpatient Encounter Medications as of 6/10/2022   Medication Sig Dispense Refill    aspirin 325 MG tablet Take 325 mg by " mouth.      buPROPion (WELLBUTRIN XL) 150 MG TB24 tablet TAKE 1 DAILY FOR 7 DAYS THEN 2 DAILY THEREAFTER 180 tablet 0    diltiaZEM (CARDIZEM CD) 240 MG 24 hr capsule TAKE 1 CAPSULE BY MOUTH DAILY AT BEDTIME      flecainide (TAMBOCOR) 100 MG Tab Take 150 mg by mouth every 12 (twelve) hours.       guanFACINE (INTUNIV ER) 2 mg Tb24 TAKE 2 MG DAILY AFTER FINISHING 1 MG BOTTLE. 90 tablet 0    ibuprofen 200 mg Cap Take 200 mg by mouth.      oxybutynin (DITROPAN XL) 10 MG 24 hr tablet Take 15 mg by mouth.       sertraline (ZOLOFT) 100 MG tablet Take 1/2 tablet daily for the first seven days then 1 tablet daily thereafter 30 tablet 2     No facility-administered encounter medications on file as of 6/10/2022.     Assessment - Diagnosis - Goals:     Impression: 68 y/o M with reportedly stable inattention, treated effectively with stimulants chronically. Has chronic mildly low moods, anergia. Didn't tolerate strattera, intuniv with modest effect but tolerable at starting doses. MOCA on follow-up was improved and in normal range. Depression improved with antidepressant treatment.     DX: MDD; inattention    Treatment Goals:  Specify outcomes written in observable, behavioral terms: improve moods by self     Treatment Plan/Recommendations:   · Continue sertraline for depression.   · Supportive psychotherapy today. Consider regular psychotherapy.     · Discussed risks, benefits, and alternatives to treatment plan documented above with patient. I answered all patient questions related to this plan and patient expressed understanding and agreement.      Return to Clinic: 2 months    Counseling time: 10 minutes  Total time: 50 minutes    CHARLOTTE Langston MD  Psychiatry  [facility]  [address] , [place] [address]  [phone]

## 2022-10-10 ENCOUNTER — OFFICE VISIT (OUTPATIENT)
Dept: PSYCHIATRY | Facility: CLINIC | Age: 67
End: 2022-10-10
Payer: COMMERCIAL

## 2022-10-10 DIAGNOSIS — F33.41 MDD (MAJOR DEPRESSIVE DISORDER), RECURRENT, IN PARTIAL REMISSION: Primary | ICD-10-CM

## 2022-10-10 PROCEDURE — 99213 OFFICE O/P EST LOW 20 MIN: CPT | Mod: S$GLB,,, | Performed by: PSYCHIATRY & NEUROLOGY

## 2022-10-10 PROCEDURE — 4010F ACE/ARB THERAPY RXD/TAKEN: CPT | Mod: CPTII,S$GLB,, | Performed by: PSYCHIATRY & NEUROLOGY

## 2022-10-10 PROCEDURE — 4010F PR ACE/ARB THEARPY RXD/TAKEN: ICD-10-PCS | Mod: CPTII,S$GLB,, | Performed by: PSYCHIATRY & NEUROLOGY

## 2022-10-10 PROCEDURE — 99213 PR OFFICE/OUTPT VISIT, EST, LEVL III, 20-29 MIN: ICD-10-PCS | Mod: S$GLB,,, | Performed by: PSYCHIATRY & NEUROLOGY

## 2022-10-10 RX ORDER — SERTRALINE HYDROCHLORIDE 100 MG/1
150 TABLET, FILM COATED ORAL DAILY
Qty: 135 TABLET | Refills: 1 | Status: SHIPPED | OUTPATIENT
Start: 2022-10-10 | End: 2023-06-14

## 2023-03-16 ENCOUNTER — TELEPHONE (OUTPATIENT)
Dept: PSYCHIATRY | Facility: CLINIC | Age: 68
End: 2023-03-16
Payer: COMMERCIAL

## 2023-06-14 ENCOUNTER — OFFICE VISIT (OUTPATIENT)
Dept: PSYCHIATRY | Facility: CLINIC | Age: 68
End: 2023-06-14
Payer: COMMERCIAL

## 2023-06-14 VITALS
WEIGHT: 279.56 LBS | BODY MASS INDEX: 33.15 KG/M2 | DIASTOLIC BLOOD PRESSURE: 74 MMHG | SYSTOLIC BLOOD PRESSURE: 139 MMHG | HEART RATE: 49 BPM

## 2023-06-14 DIAGNOSIS — F32.1 CURRENT MODERATE EPISODE OF MAJOR DEPRESSIVE DISORDER, UNSPECIFIED WHETHER RECURRENT: Primary | ICD-10-CM

## 2023-06-14 DIAGNOSIS — R41.9 COGNITIVE COMPLAINTS: ICD-10-CM

## 2023-06-14 PROCEDURE — 99214 OFFICE O/P EST MOD 30 MIN: CPT | Mod: S$GLB,,, | Performed by: PSYCHIATRY & NEUROLOGY

## 2023-06-14 PROCEDURE — 3078F DIAST BP <80 MM HG: CPT | Mod: CPTII,S$GLB,, | Performed by: PSYCHIATRY & NEUROLOGY

## 2023-06-14 PROCEDURE — 99999 PR PBB SHADOW E&M-EST. PATIENT-LVL II: CPT | Mod: PBBFAC,,, | Performed by: PSYCHIATRY & NEUROLOGY

## 2023-06-14 PROCEDURE — 3078F PR MOST RECENT DIASTOLIC BLOOD PRESSURE < 80 MM HG: ICD-10-PCS | Mod: CPTII,S$GLB,, | Performed by: PSYCHIATRY & NEUROLOGY

## 2023-06-14 PROCEDURE — 4010F PR ACE/ARB THEARPY RXD/TAKEN: ICD-10-PCS | Mod: CPTII,S$GLB,, | Performed by: PSYCHIATRY & NEUROLOGY

## 2023-06-14 PROCEDURE — 99214 PR OFFICE/OUTPT VISIT, EST, LEVL IV, 30-39 MIN: ICD-10-PCS | Mod: S$GLB,,, | Performed by: PSYCHIATRY & NEUROLOGY

## 2023-06-14 PROCEDURE — 3008F BODY MASS INDEX DOCD: CPT | Mod: CPTII,S$GLB,, | Performed by: PSYCHIATRY & NEUROLOGY

## 2023-06-14 PROCEDURE — 99999 PR PBB SHADOW E&M-EST. PATIENT-LVL II: ICD-10-PCS | Mod: PBBFAC,,, | Performed by: PSYCHIATRY & NEUROLOGY

## 2023-06-14 PROCEDURE — 3075F SYST BP GE 130 - 139MM HG: CPT | Mod: CPTII,S$GLB,, | Performed by: PSYCHIATRY & NEUROLOGY

## 2023-06-14 PROCEDURE — 3008F PR BODY MASS INDEX (BMI) DOCUMENTED: ICD-10-PCS | Mod: CPTII,S$GLB,, | Performed by: PSYCHIATRY & NEUROLOGY

## 2023-06-14 PROCEDURE — 3075F PR MOST RECENT SYSTOLIC BLOOD PRESS GE 130-139MM HG: ICD-10-PCS | Mod: CPTII,S$GLB,, | Performed by: PSYCHIATRY & NEUROLOGY

## 2023-06-14 PROCEDURE — 4010F ACE/ARB THERAPY RXD/TAKEN: CPT | Mod: CPTII,S$GLB,, | Performed by: PSYCHIATRY & NEUROLOGY

## 2023-06-14 RX ORDER — DULOXETIN HYDROCHLORIDE 20 MG/1
CAPSULE, DELAYED RELEASE ORAL
Qty: 60 CAPSULE | Refills: 2 | Status: SHIPPED | OUTPATIENT
Start: 2023-06-14 | End: 2023-11-15

## 2023-06-14 NOTE — PROGRESS NOTES
"Outpatient Psychiatry Follow-up Visit (MD/NP)    6/14/2023    Donavan Castillo, a 68 y.o. male, presenting for follow-up visit. Met with patient and his wife.     Reason for Encounter: Patient complains of chronic inattention.     Interval hx: Patient seen and interviewed for follow-up, about four months since his last visit. reports having tried himself off of it antidepressant. Then moods were worse off medication. Restarted it at 100 mg (noticed the 150 mg dose was leading him to be too passive). Moods improved. Continues to have     Wife notes the following problems - all of which are chronic, lasting >20 years. Lack of awareness of present moment; very slow responses, living in his own head. Inability to fully complete tasks; Won't take responsibility for health issues he can control. Just seems like everything is drudgery and won't take ownership/initiative much.      Health has been a problem. Had episode of weakness, diaphoresis, diagnosed with episode of "demand ischemia". Has had general neuro evaluation since last visit for reported periods of confusion. Evaluation general unrevealing of a CNS cause. MMSE 30/30, neuro assessment unrevealing of CNS problem. Anemia identified. Taking iron.     Background: Pt is a 66 y/o M with previous reported treatment for adhd, treated with stimulants since his mid-40's. Diagnosed with adhd by psychiatrist Kamari Wilson, previously treated with stimulants for many years. Reports chronic inattention interferes were personal function.     Interferes with personal (marital conflict due to chronic problems listening, missing information from his wife) and occupational functioning (difficulty completing projects, being inefficient with his performance). Describes difficulty with multi-tasking, being easily distractible, not attending consistently through conversations. Thinks it may have gotten worse over past 10 years. Denies presence of problems with inattention during " "school-age years. Denies problems with anxiety, depression, generally, though frustrated with this particular problem. Participating in couples therapy.     Some days:   Low mood  Sleep problems  Self-reproach  Decreased interest     Most days:   Low appetite  Concentration problems    Nearly daily:   Anergia  Psychomotor slowing    PHQ-9 = 14    ASRS - 7 of 18 in clinical range    MOCA - 26 (point off for not completing one final edge of 3-d cube and 3 words not recalled on MOCA, though was able to recall 2/3 with cues).     Family Hx: "lots of eccentric people". Brother ( in ) - severe OCD.  of overdose (suspected intentional). Sister, 2 years younger, "a hoarder". "the rest of us are functional but eccentric".     Psych Hx:   first problems with attention/concentration 15 years ago. Causes problems with completing tasks. Mostly stable over that time. Sometimes "walk into a room and realize I've forgotten what I went for".     diagnosed about 15 years ago. Diagnosed by Dr. Wilson.     Ritalin - worked well - took for a couple of years. 5 mg qid  adderall - didn't tolerate (mood swings)  Stopped due to a-fib    Stopped due to atrial fibrillation - has had it on/off since , usually well-managed with medication, though has had cardioversion 2-3 times. Last cardioversion was in February and was about to consent to ablation, but got reluctant when COVID arrived.     Reports moods are sometimes low, never suicidal.   Never treated for depression.   No rona - "a low energy person my whole life"  AVH - none  No delusions  No psych hospitalizations  Briefly took an antidepressant in  - prescribed by cardiologist - stopped due to no perceived benefit.       Medical Hx:   HTN  A-fib - managed with fleccanide  BPH - takes finasteride  mobic for joint pain.     Social Hx: 5 siblings, brother who  in , sister with problem with hoarding, another sister, 2 brothers (one in , one in [place]). Oldest of " "the 6. Both parents were in the home as were his maternal grandparents. Grew up in [place]. Childhood was a happy. Denies serious maltreatment. No developmental problems. Denies problems with attention & concentration as a kid. 2nd in class in . Went to [employer], studied pre-med, but didn't get accepted. 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 Continues to work full-time.      at 24 x 17 years. She had bipolar disorder.  again at 43 to second wife. Marriage is "stefano". Attention problem contributes to problems ("I don't listen the way she wants me too").      Review Of Systems:     GENERAL:  No weight gain or loss  SKIN:  No rashes or lacerations  HEAD:  No headaches  CARDIOVASCULAR:  No tachycardia or chest pain  ABDOMEN:  No nausea, vomiting, pain, constipation or diarrhea  URINARY:  No frequency, dysuria or sexual dysfunction  ENDOCRINE:  No polydipsia, polyuria  MUSCULOSKELETAL:  No pain or stiffness of the joints  NEUROLOGIC:  No weakness, sensory changes, seizures, confusion, memory loss, tremor or other abnormal movements    Current Evaluation:     Nutritional Screening: Considering the patient's height and weight, medications, medical history and preferences, should a referral be made to the dietitian? no    Constitutional  Vitals:  Most recent vital signs, dated less than 90 days prior to this appointment, were not reviewed.       General:  unremarkable, age appropriate     Musculoskeletal  Muscle Strength/Tone:  no tremor, no tic   Gait & Station:  non-ataxic     Psychiatric  Appearance: casually dressed & groomed;   Behavior: calm,   Cooperation: cooperative with assessment  Speech: normal rate, volume, tone  Thought Process: linear, goal-directed  Thought Content: No suicidal or homicidal ideation; no delusions  Affect: normal range  Mood: euthymic  Perceptions: No auditory or visual " hallucinations  Level of Consciousness: alert throughout interview  Insight: fair  Cognition: Oriented to person, place, time, & situation  Memory: no apparent deficits to general clinical interview; not formally assessed  Attention/Concentration: no apparent deficits to general clinical interview; not formally assessed  Fund of Knowledge: average by vocabulary/education    Laboratory Data  No visits with results within 1 Month(s) from this visit.   Latest known visit with results is:   No results found for any previous visit.       Medications  Outpatient Encounter Medications as of 6/14/2023   Medication Sig Dispense Refill    aspirin 325 MG tablet Take 325 mg by mouth.      diltiaZEM (CARDIZEM CD) 240 MG 24 hr capsule TAKE 1 CAPSULE BY MOUTH DAILY AT BEDTIME      flecainide (TAMBOCOR) 100 MG Tab Take 150 mg by mouth every 12 (twelve) hours.       ibuprofen 200 mg Cap Take 200 mg by mouth.      oxybutynin (DITROPAN XL) 10 MG 24 hr tablet Take 15 mg by mouth.       sertraline (ZOLOFT) 100 MG tablet Take 1.5 tablets (150 mg total) by mouth once daily. 135 tablet 1     No facility-administered encounter medications on file as of 6/14/2023.     Assessment - Diagnosis - Goals:     Impression: 67 y/o M with reportedly stable inattention, treated effectively with stimulants chronically. Has chronic mildly low moods, anergia. Didn't tolerate strattera; intuniv with modest effect but tolerable at starting doses. MOCA on follow-up was improved and in normal range. Depression improved with antidepressant treatment, worsened with discontinuation, better again on treatment. Has chronic attentional complaints that haven't improved with antidepressant treatment. Recent 30/30 MMSE at neuro assessment, no identifiable neuro illness.     DX: MDD; inattention    Treatment Goals:  Specify outcomes written in observable, behavioral terms: improve moods by self     Treatment Plan/Recommendations:   Trial of duloxetine for depression.    Supportive psychotherapy today. Consider regular psychotherapy.     Discussed risks, benefits, and alternatives to treatment plan documented above with patient. I answered all patient questions related to this plan and patient expressed understanding and agreement.      Return to Clinic: 4-6 months    CHARLOTTE Langston MD  Psychiatry  [facility]  [address] , [place] [address]  [phone]

## 2023-08-08 ENCOUNTER — PATIENT MESSAGE (OUTPATIENT)
Dept: PSYCHIATRY | Facility: CLINIC | Age: 68
End: 2023-08-08
Payer: COMMERCIAL

## 2023-08-08 RX ORDER — SERTRALINE HYDROCHLORIDE 100 MG/1
TABLET, FILM COATED ORAL
Qty: 45 TABLET | Refills: 2 | Status: SHIPPED | OUTPATIENT
Start: 2023-08-08 | End: 2023-11-06

## 2023-11-06 RX ORDER — SERTRALINE HYDROCHLORIDE 100 MG/1
TABLET, FILM COATED ORAL
Qty: 135 TABLET | Refills: 0 | Status: SHIPPED | OUTPATIENT
Start: 2023-11-06 | End: 2023-11-15 | Stop reason: SDUPTHER

## 2023-11-15 ENCOUNTER — OFFICE VISIT (OUTPATIENT)
Dept: PSYCHIATRY | Facility: CLINIC | Age: 68
End: 2023-11-15
Payer: COMMERCIAL

## 2023-11-15 DIAGNOSIS — R41.9 COGNITIVE COMPLAINTS: ICD-10-CM

## 2023-11-15 DIAGNOSIS — F33.41 MDD (MAJOR DEPRESSIVE DISORDER), RECURRENT, IN PARTIAL REMISSION: Primary | ICD-10-CM

## 2023-11-15 PROCEDURE — 99999 PR PBB SHADOW E&M-EST. PATIENT-LVL I: CPT | Mod: PBBFAC,,, | Performed by: PSYCHIATRY & NEUROLOGY

## 2023-11-15 PROCEDURE — 3044F HG A1C LEVEL LT 7.0%: CPT | Mod: CPTII,S$GLB,, | Performed by: PSYCHIATRY & NEUROLOGY

## 2023-11-15 PROCEDURE — 99999 PR PBB SHADOW E&M-EST. PATIENT-LVL I: ICD-10-PCS | Mod: PBBFAC,,, | Performed by: PSYCHIATRY & NEUROLOGY

## 2023-11-15 PROCEDURE — 3044F PR MOST RECENT HEMOGLOBIN A1C LEVEL <7.0%: ICD-10-PCS | Mod: CPTII,S$GLB,, | Performed by: PSYCHIATRY & NEUROLOGY

## 2023-11-15 PROCEDURE — 4010F PR ACE/ARB THEARPY RXD/TAKEN: ICD-10-PCS | Mod: CPTII,S$GLB,, | Performed by: PSYCHIATRY & NEUROLOGY

## 2023-11-15 PROCEDURE — 4010F ACE/ARB THERAPY RXD/TAKEN: CPT | Mod: CPTII,S$GLB,, | Performed by: PSYCHIATRY & NEUROLOGY

## 2023-11-15 PROCEDURE — 99214 PR OFFICE/OUTPT VISIT, EST, LEVL IV, 30-39 MIN: ICD-10-PCS | Mod: S$GLB,,, | Performed by: PSYCHIATRY & NEUROLOGY

## 2023-11-15 PROCEDURE — 99214 OFFICE O/P EST MOD 30 MIN: CPT | Mod: S$GLB,,, | Performed by: PSYCHIATRY & NEUROLOGY

## 2023-11-15 RX ORDER — SERTRALINE HYDROCHLORIDE 100 MG/1
TABLET, FILM COATED ORAL
Qty: 135 TABLET | Refills: 1 | Status: SHIPPED | OUTPATIENT
Start: 2023-11-15 | End: 2024-02-06

## 2023-11-15 NOTE — PROGRESS NOTES
Outpatient Psychiatry Follow-up Visit (MD/NP)    11/15/2023    Donavan Castillo, a 68 y.o. male, presenting for follow-up visit. Met with patient and his wife.     Reason for Encounter: Follow-up, depression, inattention.    Interval hx: Patient seen and interviewed for follow-up, about five months since his last visit. They report not much benefit from most recent treatment trial. No new health problems, no new medications since last visit. Sleep is ok. Continues to have problems with regulating attention, affecting functioning. Returned to sertraline which he tolerates better than duloxetine.     Background: Pt is a 66 y/o M with previous reported treatment for adhd, treated with stimulants since his mid-40's. Diagnosed with adhd by psychiatrist Kamari Wilson, previously treated with stimulants for many years. Reports chronic inattention interferes were personal function.     Interferes with personal (marital conflict due to chronic problems listening, missing information from his wife) and occupational functioning (difficulty completing projects, being inefficient with his performance). Describes difficulty with multi-tasking, being easily distractible, not attending consistently through conversations. Thinks it may have gotten worse over past 10 years. Denies presence of problems with inattention during school-age years. Denies problems with anxiety, depression, generally, though frustrated with this particular problem. Participating in couples therapy.     Some days:   Low mood  Sleep problems  Self-reproach  Decreased interest     Most days:   Low appetite  Concentration problems    Nearly daily:   Anergia  Psychomotor slowing    PHQ-9 = 14    ASRS - 7 of 18 in clinical range    MOCA - 26 (point off for not completing one final edge of 3-d cube and 3 words not recalled on MOCA, though was able to recall 2/3 with cues).     From subsequent care:   Wife notes the following problems - all of which are chronic,  "lasting >20 years. Lack of awareness of present moment; very slow responses, living in his own head. Inability to fully complete tasks; Won't take responsibility for health issues he can control. Just seems like everything is drudgery and won't take ownership/initiative much.      MMSE 30/30, neuro assessment unrevealing of CNS problem. Anemia identified. Taking iron.     Family Hx: "lots of eccentric people". Brother ( in ) - severe OCD.  of overdose (suspected intentional). Sister, 2 years younger, "a hoarder". "the rest of us are functional but eccentric".     Psych Hx:   first problems with attention/concentration 15 years ago. Causes problems with completing tasks. Mostly stable over that time. Sometimes "walk into a room and realize I've forgotten what I went for".     diagnosed about 15 years ago. Diagnosed by Dr. Wilson.     Ritalin - worked well - took for a couple of years. 5 mg qid  adderall - didn't tolerate (mood swings)  Stopped due to a-fib    Stopped due to atrial fibrillation - has had it on/off since , usually well-managed with medication, though has had cardioversion 2-3 times. Last cardioversion was in February and was about to consent to ablation, but got reluctant when COVID arrived.     Reports moods are sometimes low, never suicidal.   Never treated for depression.   No rona - "a low energy person my whole life"  AVH - none  No delusions  No psych hospitalizations  Briefly took an antidepressant in  - prescribed by cardiologist - stopped due to no perceived benefit.     Medical Hx:   HTN  A-fib - managed with fleccanide  BPH - takes finasteride  mobic for joint pain.     Social Hx: 5 siblings, brother who  in , sister with problem with hoarding, another sister, 2 brothers (one in , one in [place]). Oldest of the 6. Both parents were in the home as were his maternal grandparents. Grew up in [place]. Childhood was a happy. Denies serious maltreatment. No " "developmental problems. Denies problems with attention & concentration as a kid. 2nd in class in HS. Went to [employer], studied pre-med, but didn't get accepted. 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 Continues to work full-time.      at 24 x 17 years. She had bipolar disorder.  again at 43 to second wife. Marriage is "stefano". Attention problem contributes to problems ("I don't listen the way she wants me too").      Review Of Systems:     GENERAL:  No weight gain or loss  SKIN:  No rashes or lacerations  HEAD:  No headaches  CARDIOVASCULAR:  No tachycardia or chest pain  ABDOMEN:  No nausea, vomiting, pain, constipation or diarrhea  URINARY:  No frequency, dysuria or sexual dysfunction  ENDOCRINE:  No polydipsia, polyuria  MUSCULOSKELETAL:  No pain or stiffness of the joints  NEUROLOGIC:  No weakness, sensory changes, seizures, confusion, memory loss, tremor or other abnormal movements    Current Evaluation:     Nutritional Screening: Considering the patient's height and weight, medications, medical history and preferences, should a referral be made to the dietitian? no    Constitutional  Vitals:  Most recent vital signs, dated less than 90 days prior to this appointment, were not reviewed.       General:  unremarkable, age appropriate     Musculoskeletal  Muscle Strength/Tone:  no tremor, no tic   Gait & Station:  non-ataxic     Psychiatric  Appearance: casually dressed & groomed;   Behavior: calm,   Cooperation: cooperative with assessment  Speech: normal rate, volume, tone  Thought Process: linear, goal-directed  Thought Content: No suicidal or homicidal ideation; no delusions  Affect: normal range  Mood: euthymic  Perceptions: No auditory or visual hallucinations  Level of Consciousness: alert throughout interview  Insight: fair  Cognition: Oriented to person, place, time, & situation  Memory: no " apparent deficits to general clinical interview; not formally assessed  Attention/Concentration: no apparent deficits to general clinical interview; not formally assessed  Fund of Knowledge: average by vocabulary/education    Laboratory Data  No visits with results within 1 Month(s) from this visit.   Latest known visit with results is:   No results found for any previous visit.       Medications  Outpatient Encounter Medications as of 11/15/2023   Medication Sig Dispense Refill    aspirin 325 MG tablet Take 325 mg by mouth.      diltiaZEM (CARDIZEM CD) 240 MG 24 hr capsule TAKE 1 CAPSULE BY MOUTH DAILY AT BEDTIME      DULoxetine (CYMBALTA) 20 MG capsule Take 1 daily for 7 days then 2 daily. 60 capsule 2    flecainide (TAMBOCOR) 100 MG Tab Take 150 mg by mouth every 12 (twelve) hours.       ibuprofen 200 mg Cap Take 200 mg by mouth.      oxybutynin (DITROPAN XL) 10 MG 24 hr tablet Take 15 mg by mouth.       sertraline (ZOLOFT) 100 MG tablet TAKE 1/2 TABLET DAILY FOR 7 DAYS THEN 1 TABLET DAILY FOR 7 DAYS THEN 1 AND 1/2 TABLETS THEREAFTER. 135 tablet 0    [DISCONTINUED] sertraline (ZOLOFT) 100 MG tablet Take 1/2 tablet daily for 7 days then 1 tablet daily for 7 days then 1 and 1/2 tablets thereafter. 45 tablet 2     No facility-administered encounter medications on file as of 11/15/2023.     Assessment - Diagnosis - Goals:     Impression: 67 y/o M with reportedly stable inattention, treated effectively with stimulants chronically. Has chronic mildly low moods, anergia. Didn't tolerate strattera; intuniv with modest effect but tolerable at starting doses. MOCA on follow-up was improved and in normal range. Depression improved with antidepressant treatment, worsened with discontinuation, better again on treatment. Has chronic attentional complaints that haven't improved with antidepressant treatment. Recent 30/30 MMSE at neuro assessment, no identifiable neuro illness.     DX: MDD; inattention    Treatment Goals:   Specify outcomes written in observable, behavioral terms: improve moods by self     Treatment Plan/Recommendations:   Trial of viloxazine for inattention. Sertraline for depression.   Consider modafanil.    Discussed risks, benefits, and alternatives to treatment plan documented above with patient. I answered all patient questions related to this plan and patient expressed understanding and agreement.      Return to Clinic: 4-6 months    CHARLOTTE Langston MD  Psychiatry  [facility]  [address] , [place] [address]  [phone]

## 2023-12-05 ENCOUNTER — PATIENT MESSAGE (OUTPATIENT)
Dept: PSYCHIATRY | Facility: CLINIC | Age: 68
End: 2023-12-05
Payer: COMMERCIAL

## 2023-12-05 DIAGNOSIS — R41.840 INATTENTION: Primary | ICD-10-CM

## 2023-12-05 RX ORDER — METHYLPHENIDATE HYDROCHLORIDE 10 MG/1
TABLET ORAL
Qty: 60 TABLET | Refills: 0 | Status: SHIPPED | OUTPATIENT
Start: 2023-12-05

## 2023-12-05 RX ORDER — METHYLPHENIDATE HYDROCHLORIDE 10 MG/1
TABLET ORAL
Qty: 60 TABLET | Refills: 0 | Status: SHIPPED | OUTPATIENT
Start: 2024-01-04

## 2024-01-24 ENCOUNTER — TELEPHONE (OUTPATIENT)
Dept: PSYCHIATRY | Facility: CLINIC | Age: 69
End: 2024-01-24
Payer: COMMERCIAL

## 2024-02-06 RX ORDER — SERTRALINE HYDROCHLORIDE 100 MG/1
TABLET, FILM COATED ORAL
Qty: 135 TABLET | Refills: 0 | Status: SHIPPED | OUTPATIENT
Start: 2024-02-06 | End: 2024-04-03 | Stop reason: SDUPTHER

## 2024-02-20 ENCOUNTER — PATIENT MESSAGE (OUTPATIENT)
Dept: PSYCHIATRY | Facility: CLINIC | Age: 69
End: 2024-02-20
Payer: COMMERCIAL

## 2024-02-20 DIAGNOSIS — R41.840 INATTENTION: Primary | ICD-10-CM

## 2024-02-20 RX ORDER — METHYLPHENIDATE HYDROCHLORIDE 5 MG/1
TABLET ORAL
Qty: 120 TABLET | Refills: 0 | Status: SHIPPED | OUTPATIENT
Start: 2024-02-20 | End: 2024-02-20 | Stop reason: SDUPTHER

## 2024-02-20 RX ORDER — METHYLPHENIDATE HYDROCHLORIDE 5 MG/1
TABLET ORAL
Qty: 120 TABLET | Refills: 0 | Status: SHIPPED | OUTPATIENT
Start: 2024-03-21

## 2024-02-20 RX ORDER — METHYLPHENIDATE HYDROCHLORIDE 5 MG/1
TABLET ORAL
Qty: 120 TABLET | Refills: 0 | Status: SHIPPED | OUTPATIENT
Start: 2024-02-20

## 2024-04-01 ENCOUNTER — TELEPHONE (OUTPATIENT)
Dept: PSYCHIATRY | Facility: CLINIC | Age: 69
End: 2024-04-01
Payer: COMMERCIAL

## 2024-04-03 ENCOUNTER — OFFICE VISIT (OUTPATIENT)
Dept: PSYCHIATRY | Facility: CLINIC | Age: 69
End: 2024-04-03
Payer: COMMERCIAL

## 2024-04-03 VITALS
DIASTOLIC BLOOD PRESSURE: 78 MMHG | BODY MASS INDEX: 33.59 KG/M2 | WEIGHT: 283.31 LBS | SYSTOLIC BLOOD PRESSURE: 132 MMHG

## 2024-04-03 DIAGNOSIS — F33.41 MDD (MAJOR DEPRESSIVE DISORDER), RECURRENT, IN PARTIAL REMISSION: Primary | ICD-10-CM

## 2024-04-03 DIAGNOSIS — R41.840 INATTENTION: ICD-10-CM

## 2024-04-03 PROCEDURE — 3078F DIAST BP <80 MM HG: CPT | Mod: CPTII,S$GLB,, | Performed by: PSYCHIATRY & NEUROLOGY

## 2024-04-03 PROCEDURE — 4010F ACE/ARB THERAPY RXD/TAKEN: CPT | Mod: CPTII,S$GLB,, | Performed by: PSYCHIATRY & NEUROLOGY

## 2024-04-03 PROCEDURE — 3008F BODY MASS INDEX DOCD: CPT | Mod: CPTII,S$GLB,, | Performed by: PSYCHIATRY & NEUROLOGY

## 2024-04-03 PROCEDURE — 99214 OFFICE O/P EST MOD 30 MIN: CPT | Mod: S$GLB,,, | Performed by: PSYCHIATRY & NEUROLOGY

## 2024-04-03 PROCEDURE — 3075F SYST BP GE 130 - 139MM HG: CPT | Mod: CPTII,S$GLB,, | Performed by: PSYCHIATRY & NEUROLOGY

## 2024-04-03 PROCEDURE — 99999 PR PBB SHADOW E&M-EST. PATIENT-LVL II: CPT | Mod: PBBFAC,,, | Performed by: PSYCHIATRY & NEUROLOGY

## 2024-04-03 RX ORDER — METHYLPHENIDATE HYDROCHLORIDE 10 MG/1
TABLET ORAL
Qty: 90 TABLET | Refills: 0 | Status: SHIPPED | OUTPATIENT
Start: 2024-05-03

## 2024-04-03 RX ORDER — METHYLPHENIDATE HYDROCHLORIDE 10 MG/1
TABLET ORAL
Qty: 90 TABLET | Refills: 0 | Status: SHIPPED | OUTPATIENT
Start: 2024-06-02

## 2024-04-03 RX ORDER — METHYLPHENIDATE HYDROCHLORIDE 10 MG/1
TABLET ORAL
Qty: 90 TABLET | Refills: 0 | Status: SHIPPED | OUTPATIENT
Start: 2024-04-03

## 2024-04-03 RX ORDER — SERTRALINE HYDROCHLORIDE 100 MG/1
TABLET, FILM COATED ORAL
Qty: 135 TABLET | Refills: 0 | Status: SHIPPED | OUTPATIENT
Start: 2024-04-03

## 2024-04-03 NOTE — PROGRESS NOTES
Outpatient Psychiatry Follow-up Visit (MD/NP)    4/3/2024    Donavan Castillo, a 68 y.o. male, presenting for follow-up visit. Met with patient and his wife.     Reason for Encounter: Follow-up, depression, inattention.    Interval hx: Patient seen and interviewed for follow-up, about four and a half months since his last visit. Concentration improved. Moods are ok, improved. No new medication. No new health problems.     Background: Pt is a 64 y/o M with previous reported treatment for adhd, treated with stimulants since his mid-40's. Diagnosed with adhd by psychiatrist Kamari Wilson, previously treated with stimulants for many years. Reports chronic inattention interferes were personal function.     Interferes with personal (marital conflict due to chronic problems listening, missing information from his wife) and occupational functioning (difficulty completing projects, being inefficient with his performance). Describes difficulty with multi-tasking, being easily distractible, not attending consistently through conversations. Thinks it may have gotten worse over past 10 years. Denies presence of problems with inattention during school-age years. Denies problems with anxiety, depression, generally, though frustrated with this particular problem. Participating in couples therapy.     Some days:   Low mood  Sleep problems  Self-reproach  Decreased interest     Most days:   Low appetite  Concentration problems    Nearly daily:   Anergia  Psychomotor slowing    PHQ-9 = 14    ASRS - 7 of 18 in clinical range    MOCA - 26 (point off for not completing one final edge of 3-d cube and 3 words not recalled on MOCA, though was able to recall 2/3 with cues).     From subsequent care:   Wife notes the following problems - all of which are chronic, lasting >20 years. Lack of awareness of present moment; very slow responses, living in his own head. Inability to fully complete tasks; Won't take responsibility for health issues he  "can control. Just seems like everything is drudgery and won't take ownership/initiative much.      MMSE 30/30, neuro assessment unrevealing of CNS problem. Anemia identified. Taking iron.     Family Hx: "lots of eccentric people". Brother ( in ) - severe OCD.  of overdose (suspected intentional). Sister, 2 years younger, "a hoarder". "the rest of us are functional but eccentric".     Psych Hx:   first problems with attention/concentration 15 years ago. Causes problems with completing tasks. Mostly stable over that time. Sometimes "walk into a room and realize I've forgotten what I went for".     diagnosed about 15 years ago. Diagnosed by Dr. Wilson.     Ritalin - worked well - took for a couple of years. 5 mg qid  adderall - didn't tolerate (mood swings)  Stopped due to a-fib    Stopped due to atrial fibrillation - has had it on/off since , usually well-managed with medication, though has had cardioversion 2-3 times. Last cardioversion was in February and was about to consent to ablation, but got reluctant when COVID arrived.     Reports moods are sometimes low, never suicidal.   Never treated for depression.   No rona - "a low energy person my whole life"  AVH - none  No delusions  No psych hospitalizations  Briefly took an antidepressant in  - prescribed by cardiologist - stopped due to no perceived benefit.     Medical Hx:   HTN  A-fib - managed with fleccanide  BPH - takes finasteride  mobic for joint pain.     Social Hx: 5 siblings, brother who  in , sister with problem with hoarding, another sister, 2 brothers (one in , one in [place]). Oldest of the 6. Both parents were in the home as were his maternal grandparents. Grew up in [place]. Childhood was a happy. Denies serious maltreatment. No developmental problems. Denies problems with attention & concentration as a kid. 2nd in class in . Went to [employer], studied pre-med, but didn't get accepted. 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 Continues to work full-time.      at 24 x 17 years. She had bipolar disorder.  again at 43 to second wife. Marriage is "stefano". Attention problem contributes to problems ("I don't listen the way she wants me too").      Review Of Systems:     GENERAL:  No weight gain or loss  SKIN:  No rashes or lacerations  HEAD:  No headaches  CARDIOVASCULAR:  No tachycardia or chest pain  ABDOMEN:  No nausea, vomiting, pain, constipation or diarrhea  URINARY:  No frequency, dysuria or sexual dysfunction  ENDOCRINE:  No polydipsia, polyuria  MUSCULOSKELETAL:  No pain or stiffness of the joints  NEUROLOGIC:  No weakness, sensory changes, seizures, confusion, memory loss, tremor or other abnormal movements    Current Evaluation:     Nutritional Screening: Considering the patient's height and weight, medications, medical history and preferences, should a referral be made to the dietitian? no    Constitutional  Vitals:  Most recent vital signs, dated less than 90 days prior to this appointment, were not reviewed.       General:  unremarkable, age appropriate     Musculoskeletal  Muscle Strength/Tone:  no tremor, no tic   Gait & Station:  non-ataxic     Psychiatric  Appearance: casually dressed & groomed;   Behavior: calm,   Cooperation: cooperative with assessment  Speech: normal rate, volume, tone  Thought Process: linear, goal-directed  Thought Content: No suicidal or homicidal ideation; no delusions  Affect: normal range  Mood: euthymic  Perceptions: No auditory or visual hallucinations  Level of Consciousness: alert throughout interview  Insight: fair  Cognition: Oriented to person, place, time, & situation  Memory: no apparent deficits to general clinical interview; not formally assessed  Attention/Concentration: no apparent deficits to general clinical interview; not formally assessed  Fund of Knowledge: " average by vocabulary/education    Laboratory Data  No visits with results within 1 Month(s) from this visit.   Latest known visit with results is:   No results found for any previous visit.       Medications  Outpatient Encounter Medications as of 4/3/2024   Medication Sig Dispense Refill    aspirin 325 MG tablet Take 325 mg by mouth.      diltiaZEM (CARDIZEM CD) 240 MG 24 hr capsule TAKE 1 CAPSULE BY MOUTH DAILY AT BEDTIME      flecainide (TAMBOCOR) 100 MG Tab Take 150 mg by mouth every 12 (twelve) hours.       ibuprofen 200 mg Cap Take 200 mg by mouth.      methylphenidate HCl (RITALIN) 10 MG tablet Take 1 tablet twice daily as needed for attention. 60 tablet 0    methylphenidate HCl (RITALIN) 10 MG tablet Take 1 tablet twice daily as needed for attention. 60 tablet 0    methylphenidate HCl (RITALIN) 5 MG tablet Take 1 to 2 tablets twice daily as needed for attention. 120 tablet 0    methylphenidate HCl (RITALIN) 5 MG tablet Take 1 to 2 tablets twice daily as needed for attention. 120 tablet 0    oxybutynin (DITROPAN XL) 10 MG 24 hr tablet Take 15 mg by mouth.       sertraline (ZOLOFT) 100 MG tablet TAKE 1 AND 1/2 TABLETS DAILY. 135 tablet 0    viloxazine 100 mg Cp24 Take 100 mg by mouth once daily. 30 capsule 2     No facility-administered encounter medications on file as of 4/3/2024.     Assessment - Diagnosis - Goals:     Impression: 67 y/o M with reportedly stable inattention, treated effectively with stimulants chronically. Has chronic mildly low moods, anergia. Didn't tolerate strattera; intuniv with modest effect but tolerable at starting doses. MOCA on follow-up was improved and in normal range. Depression improved with antidepressant treatment, worsened with discontinuation, better again on treatment. Has chronic attentional complaints that haven't improved with antidepressant treatment. Recent 30/30 MMSE at neuro assessment, no identifiable neuro illness.     DX: MDD; inattention    Treatment Goals:   Specify outcomes written in observable, behavioral terms: improve moods by self     Treatment Plan/Recommendations:   Methylphenidate 15 mg bid. Sertraline for depression.   Consider modafanil.    Discussed risks, benefits, and alternatives to treatment plan documented above with patient. I answered all patient questions related to this plan and patient expressed understanding and agreement.      Return to Clinic: 4-6 months    CHARLOTTE Langston MD  Psychiatry, [facility]

## 2024-07-09 ENCOUNTER — OFFICE VISIT (OUTPATIENT)
Dept: PSYCHIATRY | Facility: CLINIC | Age: 69
End: 2024-07-09
Payer: COMMERCIAL

## 2024-07-09 DIAGNOSIS — F33.41 MDD (MAJOR DEPRESSIVE DISORDER), RECURRENT, IN PARTIAL REMISSION: Primary | ICD-10-CM

## 2024-07-09 DIAGNOSIS — R41.840 INATTENTION: ICD-10-CM

## 2024-07-09 PROCEDURE — 4010F ACE/ARB THERAPY RXD/TAKEN: CPT | Mod: CPTII,95,, | Performed by: PSYCHIATRY & NEUROLOGY

## 2024-07-09 PROCEDURE — 99214 OFFICE O/P EST MOD 30 MIN: CPT | Mod: 95,,, | Performed by: PSYCHIATRY & NEUROLOGY

## 2024-07-09 RX ORDER — METHYLPHENIDATE HYDROCHLORIDE 10 MG/1
TABLET ORAL
Qty: 90 TABLET | Refills: 0 | Status: SHIPPED | OUTPATIENT
Start: 2024-08-07

## 2024-07-09 RX ORDER — METHYLPHENIDATE HYDROCHLORIDE 10 MG/1
TABLET ORAL
Qty: 90 TABLET | Refills: 0 | Status: SHIPPED | OUTPATIENT
Start: 2024-07-09

## 2024-07-09 RX ORDER — SERTRALINE HYDROCHLORIDE 100 MG/1
TABLET, FILM COATED ORAL
Qty: 135 TABLET | Refills: 0 | Status: SHIPPED | OUTPATIENT
Start: 2024-07-09

## 2024-07-09 RX ORDER — METHYLPHENIDATE HYDROCHLORIDE 10 MG/1
TABLET ORAL
Qty: 90 TABLET | Refills: 0 | Status: SHIPPED | OUTPATIENT
Start: 2024-08-08

## 2024-07-09 NOTE — PROGRESS NOTES
Outpatient Psychiatry Follow-up Visit (MD/NP)    7/9/2024    Donavan Castillo, a 69 y.o. male, presenting for follow-up visit. Met with patient and his wife.     Reason for Encounter: Follow-up, depression, inattention.    Interval hx: Patient seen and interviewed for follow-up, about three months since his last visit. Concentration remains improved and adequate for roles and goals. Moods are mostly euthymic. No new medications. Had ED visit for chest pain, no acute health events identified.     Background: Pt is a 66 y/o M with previous reported treatment for adhd, treated with stimulants since his mid-40's. Diagnosed with adhd by psychiatrist Kamari Wilson, previously treated with stimulants for many years. Reports chronic inattention interferes were personal function.     Interferes with personal (marital conflict due to chronic problems listening, missing information from his wife) and occupational functioning (difficulty completing projects, being inefficient with his performance). Describes difficulty with multi-tasking, being easily distractible, not attending consistently through conversations. Thinks it may have gotten worse over past 10 years. Denies presence of problems with inattention during school-age years. Denies problems with anxiety, depression, generally, though frustrated with this particular problem. Participating in couples therapy.     Some days:   Low mood  Sleep problems  Self-reproach  Decreased interest     Most days:   Low appetite  Concentration problems    Nearly daily:   Anergia  Psychomotor slowing    PHQ-9 = 14    ASRS - 7 of 18 in clinical range    MOCA - 26 (point off for not completing one final edge of 3-d cube and 3 words not recalled on MOCA, though was able to recall 2/3 with cues).     From subsequent care:   Wife notes the following problems - all of which are chronic, lasting >20 years. Lack of awareness of present moment; very slow responses, living in his own head.  "Inability to fully complete tasks; Won't take responsibility for health issues he can control. Just seems like everything is drudgery and won't take ownership/initiative much.      MMSE 30/30, neuro assessment unrevealing of CNS problem. Anemia identified. Taking iron.     Family Hx: "lots of eccentric people". Brother ( in ) - severe OCD.  of overdose (suspected intentional). Sister, 2 years younger, "a hoarder". "the rest of us are functional but eccentric".     Psych Hx:   first problems with attention/concentration 15 years ago. Causes problems with completing tasks. Mostly stable over that time. Sometimes "walk into a room and realize I've forgotten what I went for".     diagnosed about 15 years ago. Diagnosed by Dr. Wilson.     Ritalin - worked well - took for a couple of years. 5 mg qid  adderall - didn't tolerate (mood swings)  Stopped due to a-fib    Stopped due to atrial fibrillation - has had it on/off since , usually well-managed with medication, though has had cardioversion 2-3 times. Last cardioversion was in February and was about to consent to ablation, but got reluctant when COVID arrived.     Reports moods are sometimes low, never suicidal.   Never treated for depression.   No rona - "a low energy person my whole life"  AVH - none  No delusions  No psych hospitalizations  Briefly took an antidepressant in  - prescribed by cardiologist - stopped due to no perceived benefit.     Medical Hx:   HTN  A-fib - managed with fleccanide  BPH - takes finasteride  mobic for joint pain.     Social Hx: 5 siblings, brother who  in , sister with problem with hoarding, another sister, 2 brothers (one in , one in [place]). Oldest of the 6. Both parents were in the home as were his maternal grandparents. Grew up in [place]. Childhood was a happy. Denies serious maltreatment. No developmental problems. Denies problems with attention & concentration as a kid. 2nd in class in . Went to " "[employer], studied pre-med, but didn't get accepted. 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      at 24 x 17 years. She had bipolar disorder.  again at 43 to second wife. Marriage is "stefano". Attention problem contributes to problems ("I don't listen the way she wants me too").      Review Of Systems:     GENERAL:  No weight gain or loss  SKIN:  No rashes or lacerations  HEAD:  No headaches  CARDIOVASCULAR:  No tachycardia or chest pain  ABDOMEN:  No nausea, vomiting, pain, constipation or diarrhea  URINARY:  No frequency, dysuria or sexual dysfunction  ENDOCRINE:  No polydipsia, polyuria  MUSCULOSKELETAL:  No pain or stiffness of the joints  NEUROLOGIC:  No weakness, sensory changes, seizures, confusion, memory loss, tremor or other abnormal movements    Current Evaluation:     Nutritional Screening: Considering the patient's height and weight, medications, medical history and preferences, should a referral be made to the dietitian? no    Constitutional  Vitals:  Most recent vital signs, dated less than 90 days prior to this appointment, were not reviewed.       General:  unremarkable, age appropriate     Musculoskeletal  Muscle Strength/Tone:  no tremor, no tic   Gait & Station:  non-ataxic     Psychiatric  Appearance: casually dressed & groomed;   Behavior: calm,   Cooperation: cooperative with assessment  Speech: normal rate, volume, tone  Thought Process: linear, goal-directed  Thought Content: No suicidal or homicidal ideation; no delusions  Affect: normal range  Mood: euthymic  Perceptions: No auditory or visual hallucinations  Level of Consciousness: alert throughout interview  Insight: fair  Cognition: Oriented to person, place, time, & situation  Memory: no apparent deficits to general clinical interview; not formally assessed  Attention/Concentration: no apparent " deficits to general clinical interview; not formally assessed  Fund of Knowledge: average by vocabulary/education    Laboratory Data  No visits with results within 1 Month(s) from this visit.   Latest known visit with results is:   No results found for any previous visit.       Medications  Outpatient Encounter Medications as of 7/9/2024   Medication Sig Dispense Refill    aspirin 325 MG tablet Take 325 mg by mouth.      diltiaZEM (CARDIZEM CD) 240 MG 24 hr capsule TAKE 1 CAPSULE BY MOUTH DAILY AT BEDTIME      flecainide (TAMBOCOR) 100 MG Tab Take 150 mg by mouth every 12 (twelve) hours.       ibuprofen 200 mg Cap Take 200 mg by mouth.      methylphenidate HCl (RITALIN) 10 MG tablet Take 1 tablet twice daily as needed for attention. 60 tablet 0    methylphenidate HCl (RITALIN) 10 MG tablet Take 1 tablet twice daily as needed for attention. 60 tablet 0    methylphenidate HCl (RITALIN) 10 MG tablet Take 1 and 1/2 tablets twice daily as needed. 90 tablet 0    methylphenidate HCl (RITALIN) 10 MG tablet Take 1 and 1/2 tablets twice daily as needed. 90 tablet 0    methylphenidate HCl (RITALIN) 10 MG tablet Take 1 and 1/2 tablets twice daily as needed. 90 tablet 0    methylphenidate HCl (RITALIN) 5 MG tablet Take 1 to 2 tablets twice daily as needed for attention. 120 tablet 0    methylphenidate HCl (RITALIN) 5 MG tablet Take 1 to 2 tablets twice daily as needed for attention. 120 tablet 0    oxybutynin (DITROPAN XL) 10 MG 24 hr tablet Take 15 mg by mouth.       sertraline (ZOLOFT) 100 MG tablet TAKE 1 AND 1/2 TABLETS DAILY. 135 tablet 0     No facility-administered encounter medications on file as of 7/9/2024.     Assessment - Diagnosis - Goals:     Impression: 70 y/o M with reportedly stable inattention, treated effectively with stimulants chronically. Has chronic mildly low moods, anergia. Didn't tolerate strattera; intuniv with modest effect but tolerable at starting doses. MOCA on follow-up was improved and in normal  range. Depression improved with antidepressant treatment, worsened with discontinuation, better again on treatment. chronic attentional complaints didn't improve with antidepressant treatment. Recent 30/30 MMSE at neuro assessment, no identifiable neuro illness. Cardiologist cleared use for stimulant. Attention improved with stimulant.     DX: MDD; inattention    Treatment Goals:  Specify outcomes written in observable, behavioral terms: improve moods by self     Treatment Plan/Recommendations:   Methylphenidate 15 mg bid. Sertraline for depression.   Consider modafanil.    Discussed risks, benefits, and alternatives to treatment plan documented above with patient. I answered all patient questions related to this plan and patient expressed understanding and agreement.      Return to Clinic: 3 months    CHARLOTTE Langston MD  Psychiatry, [facility]

## 2024-10-04 ENCOUNTER — OFFICE VISIT (OUTPATIENT)
Dept: PSYCHIATRY | Facility: CLINIC | Age: 69
End: 2024-10-04
Payer: COMMERCIAL

## 2024-10-04 DIAGNOSIS — F33.41 MDD (MAJOR DEPRESSIVE DISORDER), RECURRENT, IN PARTIAL REMISSION: Primary | ICD-10-CM

## 2024-10-04 DIAGNOSIS — R41.840 INATTENTION: ICD-10-CM

## 2024-10-04 PROCEDURE — 99214 OFFICE O/P EST MOD 30 MIN: CPT | Mod: 95,,, | Performed by: PSYCHIATRY & NEUROLOGY

## 2024-10-04 PROCEDURE — 4010F ACE/ARB THERAPY RXD/TAKEN: CPT | Mod: CPTII,95,, | Performed by: PSYCHIATRY & NEUROLOGY

## 2024-10-04 PROCEDURE — 3044F HG A1C LEVEL LT 7.0%: CPT | Mod: CPTII,95,, | Performed by: PSYCHIATRY & NEUROLOGY

## 2024-10-04 RX ORDER — METHYLPHENIDATE HYDROCHLORIDE 10 MG/1
TABLET ORAL
Qty: 60 TABLET | Refills: 0 | Status: SHIPPED | OUTPATIENT
Start: 2024-12-03

## 2024-10-04 RX ORDER — METHYLPHENIDATE HYDROCHLORIDE 10 MG/1
TABLET ORAL
Qty: 60 TABLET | Refills: 0 | Status: SHIPPED | OUTPATIENT
Start: 2024-11-03

## 2024-10-04 RX ORDER — SERTRALINE HYDROCHLORIDE 100 MG/1
TABLET, FILM COATED ORAL
Qty: 135 TABLET | Refills: 0 | Status: SHIPPED | OUTPATIENT
Start: 2024-10-04

## 2024-10-04 RX ORDER — METHYLPHENIDATE HYDROCHLORIDE 10 MG/1
TABLET ORAL
Qty: 60 TABLET | Refills: 0 | Status: SHIPPED | OUTPATIENT
Start: 2024-10-04

## 2024-10-04 NOTE — PROGRESS NOTES
Outpatient Psychiatry Follow-up Visit (MD/NP)    10/4/2024    Donavan Castillo, a 69 y.o. male, presenting for follow-up visit. Met with patient and his wife.     Reason for Encounter: Follow-up, depression, inattention.    Interval hx: Patient seen and interviewed for follow-up, about three months since his last visit. Reports previously reported symptoms are ongoing, greatly improved, stable, and manageable. He has been adherent to sertraline and reports it has been helpful. Finds that it has helped him feel calmer and less anxious and irritable. Has had side effects - complacent. Milder over time and he thinks is   Had an emergency department visit for palpitations, noted to have PVC's. Cut back on ritalin to 20 mg. Higher dose also associated with irritability. Dislocation of shoulder this past weekend. Doing physical therapy exercises to strengthen it. Job intermittently stressful. Concentration remains improved and adequate for roles and goals. No new medications. No upcoming anticipated stressors.     Background: Pt is a 64 y/o M with previous reported treatment for adhd, treated with stimulants since his mid-40's. Diagnosed with adhd by psychiatrist Kamari Wilson, previously treated with stimulants for many years. Reports chronic inattention interferes were personal function.     Interferes with personal (marital conflict due to chronic problems listening, missing information from his wife) and occupational functioning (difficulty completing projects, being inefficient with his performance). Describes difficulty with multi-tasking, being easily distractible, not attending consistently through conversations. Thinks it may have gotten worse over past 10 years. Denies presence of problems with inattention during school-age years. Denies problems with anxiety, depression, generally, though frustrated with this particular problem. Participating in couples therapy.     Some days:   Low mood  Sleep  "problems  Self-reproach  Decreased interest     Most days:   Low appetite  Concentration problems    Nearly daily:   Anergia  Psychomotor slowing    PHQ-9 = 14    ASRS - 7 of 18 in clinical range    MOCA - 26 (point off for not completing one final edge of 3-d cube and 3 words not recalled on MOCA, though was able to recall 2/3 with cues).     From subsequent care:   Wife notes the following problems - all of which are chronic, lasting >20 years. Lack of awareness of present moment; very slow responses, living in his own head. Inability to fully complete tasks; Won't take responsibility for health issues he can control. Just seems like everything is drudgery and won't take ownership/initiative much.      MMSE 30/30, neuro assessment unrevealing of CNS problem. Anemia identified. Taking iron.     Family Hx: "lots of eccentric people". Brother ( in ) - severe OCD.  of overdose (suspected intentional). Sister, 2 years younger, "a hoarder". "the rest of us are functional but eccentric".     Psych Hx:   first problems with attention/concentration 15 years ago. Causes problems with completing tasks. Mostly stable over that time. Sometimes "walk into a room and realize I've forgotten what I went for".     diagnosed about 15 years ago. Diagnosed by Dr. Wilson.     Ritalin - worked well - took for a couple of years. 5 mg qid  adderall - didn't tolerate (mood swings)  Stopped due to a-fib    Stopped due to atrial fibrillation - has had it on/off since , usually well-managed with medication, though has had cardioversion 2-3 times. Last cardioversion was in February and was about to consent to ablation, but got reluctant when COVID arrived.     Reports moods are sometimes low, never suicidal.   Never treated for depression.   No rona - "a low energy person my whole life"  AVH - none  No delusions  No psych hospitalizations  Briefly took an antidepressant in  - prescribed by cardiologist - stopped due to " "no perceived benefit.     Medical Hx:   HTN  A-fib - managed with fleccanide  BPH - takes finasteride  mobic for joint pain.     Social Hx: 5 siblings, brother who  in '02, sister with problem with hoarding, another sister, 2 brothers (one in [place], one in [place]). Oldest of the 6. Both parents were in the home as were his maternal grandparents. Grew up in [place]. Childhood was a happy. Denies serious maltreatment. No developmental problems. Denies problems with attention & concentration as a kid. 2nd in class in . Went to [employer], studied pre-med, but didn't get accepted. 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 Continues to work full-time.      at 24 x 17 years. She had bipolar disorder.  again at 43 to second wife. Marriage is "stefano". Attention problem contributes to problems ("I don't listen the way she wants me too").      Review Of Systems:     GENERAL:  No weight gain or loss  SKIN:  No rashes or lacerations  HEAD:  No headaches  CARDIOVASCULAR:  No tachycardia or chest pain  ABDOMEN:  No nausea, vomiting, pain, constipation or diarrhea  URINARY:  No frequency, dysuria or sexual dysfunction  ENDOCRINE:  No polydipsia, polyuria  MUSCULOSKELETAL:  No pain or stiffness of the joints  NEUROLOGIC:  No weakness, sensory changes, seizures, confusion, memory loss, tremor or other abnormal movements    Current Evaluation:     Nutritional Screening: Considering the patient's height and weight, medications, medical history and preferences, should a referral be made to the dietitian? no    Constitutional  Vitals:  Most recent vital signs, dated less than 90 days prior to this appointment, were not reviewed.       General:  unremarkable, age appropriate     Musculoskeletal  Muscle Strength/Tone:  no tremor, no tic   Gait & Station:  non-ataxic     Psychiatric  Appearance: casually dressed & groomed; "   Behavior: calm,   Cooperation: cooperative with assessment  Speech: normal rate, volume, tone  Thought Process: linear, goal-directed  Thought Content: No suicidal or homicidal ideation; no delusions  Affect: normal range  Mood: euthymic  Perceptions: No auditory or visual hallucinations  Level of Consciousness: alert throughout interview  Insight: fair  Cognition: Oriented to person, place, time, & situation  Memory: no apparent deficits to general clinical interview; not formally assessed  Attention/Concentration: no apparent deficits to general clinical interview; not formally assessed  Fund of Knowledge: average by vocabulary/education    Laboratory Data  No visits with results within 1 Month(s) from this visit.   Latest known visit with results is:   No results found for any previous visit.       Medications  Outpatient Encounter Medications as of 10/4/2024   Medication Sig Dispense Refill    aspirin 325 MG tablet Take 325 mg by mouth.      diltiaZEM (CARDIZEM CD) 240 MG 24 hr capsule TAKE 1 CAPSULE BY MOUTH DAILY AT BEDTIME      flecainide (TAMBOCOR) 100 MG Tab Take 150 mg by mouth every 12 (twelve) hours.       ibuprofen 200 mg Cap Take 200 mg by mouth.      methylphenidate HCl (RITALIN) 10 MG tablet Take 1 tablet twice daily as needed for attention. 60 tablet 0    methylphenidate HCl (RITALIN) 10 MG tablet Take 1 tablet twice daily as needed for attention. 60 tablet 0    methylphenidate HCl (RITALIN) 10 MG tablet Take 1 and 1/2 tablets twice daily as needed. 90 tablet 0    methylphenidate HCl (RITALIN) 10 MG tablet Take 1 and 1/2 tablets twice daily as needed. 90 tablet 0    methylphenidate HCl (RITALIN) 10 MG tablet Take 1 and 1/2 tablets twice daily as needed. 90 tablet 0    methylphenidate HCl (RITALIN) 10 MG tablet Take 1 and 1/2 tablets twice daily as needed. 90 tablet 0    oxybutynin (DITROPAN XL) 10 MG 24 hr tablet Take 15 mg by mouth.       sertraline (ZOLOFT) 100 MG tablet TAKE 1 AND 1/2 TABLETS  DAILY. 135 tablet 0     No facility-administered encounter medications on file as of 10/4/2024.     Assessment - Diagnosis - Goals:     Impression: 68 y/o M with reportedly stable inattention, treated effectively with stimulants chronically. Has chronic mildly low moods, anergia. Didn't tolerate strattera; intuniv with modest effect but tolerable at starting doses. MOCA on follow-up was improved and in normal range. Depression improved with antidepressant treatment, worsened with discontinuation, better again on treatment. chronic attentional complaints didn't improve with antidepressant treatment. Recent 30/30 MMSE at neuro assessment, no identifiable neuro illness. Cardiologist cleared use for stimulant. Attention improved with stimulant.     DX: MDD; inattention    Treatment Goals:  Specify outcomes written in observable, behavioral terms: improve moods by self     Treatment Plan/Recommendations:   Methylphenidate to 10 mg bid. Sertraline for depression.   Consider modafanil.    Discussed risks, benefits, and alternatives to treatment plan documented above with patient. I answered all patient questions related to this plan and patient expressed understanding and agreement.      Return to Clinic: 3 months    CHARLOTTE Langston MD  Psychiatry, [facility]

## 2025-02-13 ENCOUNTER — OFFICE VISIT (OUTPATIENT)
Dept: PSYCHIATRY | Facility: CLINIC | Age: 70
End: 2025-02-13
Payer: COMMERCIAL

## 2025-02-13 DIAGNOSIS — R41.840 INATTENTION: Primary | ICD-10-CM

## 2025-02-13 DIAGNOSIS — F33.41 MDD (MAJOR DEPRESSIVE DISORDER), RECURRENT, IN PARTIAL REMISSION: ICD-10-CM

## 2025-02-13 PROCEDURE — 98006 SYNCH AUDIO-VIDEO EST MOD 30: CPT | Mod: 95,,, | Performed by: PSYCHIATRY & NEUROLOGY

## 2025-02-13 RX ORDER — METHYLPHENIDATE HYDROCHLORIDE 10 MG/1
TABLET ORAL
Qty: 90 TABLET | Refills: 0 | Status: SHIPPED | OUTPATIENT
Start: 2025-04-14

## 2025-02-13 RX ORDER — METHYLPHENIDATE HYDROCHLORIDE 10 MG/1
TABLET ORAL
Qty: 60 TABLET | Refills: 0 | Status: SHIPPED | OUTPATIENT
Start: 2025-02-13

## 2025-02-13 RX ORDER — METHYLPHENIDATE HYDROCHLORIDE 10 MG/1
TABLET ORAL
Qty: 90 TABLET | Refills: 0 | Status: SHIPPED | OUTPATIENT
Start: 2025-03-15

## 2025-02-13 RX ORDER — SERTRALINE HYDROCHLORIDE 100 MG/1
TABLET, FILM COATED ORAL
Qty: 90 TABLET | Refills: 0 | Status: SHIPPED | OUTPATIENT
Start: 2025-02-13

## 2025-02-13 NOTE — PROGRESS NOTES
Outpatient Psychiatry Follow-up Visit (MD/NP)    2/13/2025    Donavan Castillo, a 69 y.o. male, presenting for follow-up visit. Met with patient and his wife.     Reason for Encounter: Follow-up, depression, inattention.    Interval hx: Patient seen and interviewed for follow-up, about three months since his last visit. Moods more or less the same. Euthymic to low at times. Tracks with his health.   Reduced sertraline since last visit. 150 mg was associated with more sluggishness. Experiences fewer side effects with 100 mg dose, ok therapeutic effects. Experiences some stresses at home. Had an episode of a-fib. Was brief and stopped with flecanide. Perhaps was triggered by stress. No upcoming anticipated stressors.     Background: Pt is a 66 y/o M with previous reported treatment for adhd, treated with stimulants since his mid-40's. Diagnosed with adhd by psychiatrist Kamari Wilson, previously treated with stimulants for many years. Reports chronic inattention interferes were personal function.     Interferes with personal (marital conflict due to chronic problems listening, missing information from his wife) and occupational functioning (difficulty completing projects, being inefficient with his performance). Describes difficulty with multi-tasking, being easily distractible, not attending consistently through conversations. Thinks it may have gotten worse over past 10 years. Denies presence of problems with inattention during school-age years. Denies problems with anxiety, depression, generally, though frustrated with this particular problem. Participating in couples therapy.     Some days:   Low mood  Sleep problems  Self-reproach  Decreased interest     Most days:   Low appetite  Concentration problems    Nearly daily:   Anergia  Psychomotor slowing    PHQ-9 = 14    ASRS - 7 of 18 in clinical range    MOCA - 26 (point off for not completing one final edge of 3-d cube and 3 words not recalled on MOCA, though was  "able to recall 2/3 with cues).     From subsequent care:   Wife notes the following problems - all of which are chronic, lasting >20 years. Lack of awareness of present moment; very slow responses, living in his own head. Inability to fully complete tasks; Won't take responsibility for health issues he can control. Just seems like everything is drudgery and won't take ownership/initiative much.      MMSE 30/30, neuro assessment unrevealing of CNS problem. Anemia identified. Taking iron.     Family Hx: "lots of eccentric people". Brother ( in ) - severe OCD.  of overdose (suspected intentional). Sister, 2 years younger, "a hoarder". "the rest of us are functional but eccentric".     Psych Hx:   first problems with attention/concentration 15 years ago. Causes problems with completing tasks. Mostly stable over that time. Sometimes "walk into a room and realize I've forgotten what I went for".     diagnosed about 15 years ago. Diagnosed by Dr. Wilson.     Ritalin - worked well - took for a couple of years. 5 mg qid  adderall - didn't tolerate (mood swings)  Stopped due to a-fib    Stopped due to atrial fibrillation - has had it on/off since , usually well-managed with medication, though has had cardioversion 2-3 times. Last cardioversion was in February and was about to consent to ablation, but got reluctant when COVID arrived.     Reports moods are sometimes low, never suicidal.   Never treated for depression.   No rona - "a low energy person my whole life"  AVH - none  No delusions  No psych hospitalizations  Briefly took an antidepressant in  - prescribed by cardiologist - stopped due to no perceived benefit.     Medical Hx:   HTN  A-fib - managed with fleccanide  BPH - takes finasteride  mobic for joint pain.     Social Hx: 5 siblings, brother who  in , sister with problem with hoarding, another sister, 2 brothers (one in , one in [place]). Oldest of the 6. Both parents were in the home as " "were his maternal grandparents. Grew up in [place]. Childhood was a happy. Denies serious maltreatment. No developmental problems. Denies problems with attention & concentration as a kid. 2nd in class in . Went to [employer], studied pre-med, but didn't get accepted. 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 Continues to work full-time.      at 24 x 17 years. She had bipolar disorder.  again at 43 to second wife. Marriage is "stefano". Attention problem contributes to problems ("I don't listen the way she wants me too").      Review Of Systems:     GENERAL:  No weight gain or loss  SKIN:  No rashes or lacerations  HEAD:  No headaches  CARDIOVASCULAR:  No tachycardia or chest pain  ABDOMEN:  No nausea, vomiting, pain, constipation or diarrhea  URINARY:  No frequency, dysuria or sexual dysfunction  ENDOCRINE:  No polydipsia, polyuria  MUSCULOSKELETAL:  No pain or stiffness of the joints  NEUROLOGIC:  No weakness, sensory changes, seizures, confusion, memory loss, tremor or other abnormal movements    Current Evaluation:     Nutritional Screening: Considering the patient's height and weight, medications, medical history and preferences, should a referral be made to the dietitian? no    Constitutional  Vitals:  Most recent vital signs, dated less than 90 days prior to this appointment, were not reviewed.       General:  unremarkable, age appropriate     Musculoskeletal  Muscle Strength/Tone:  no tremor, no tic   Gait & Station:  non-ataxic     Psychiatric  Appearance: casually dressed & groomed;   Behavior: calm,   Cooperation: cooperative with assessment  Speech: normal rate, volume, tone  Thought Process: linear, goal-directed  Thought Content: No suicidal or homicidal ideation; no delusions  Affect: normal range  Mood: euthymic  Perceptions: No auditory or visual hallucinations  Level of Consciousness: alert " throughout interview  Insight: fair  Cognition: Oriented to person, place, time, & situation  Memory: no apparent deficits to general clinical interview; not formally assessed  Attention/Concentration: no apparent deficits to general clinical interview; not formally assessed  Fund of Knowledge: average by vocabulary/education    Laboratory Data  No visits with results within 1 Month(s) from this visit.   Latest known visit with results is:   No results found for any previous visit.       Medications  Outpatient Encounter Medications as of 2/13/2025   Medication Sig Dispense Refill    aspirin 325 MG tablet Take 325 mg by mouth.      diltiaZEM (CARDIZEM CD) 240 MG 24 hr capsule TAKE 1 CAPSULE BY MOUTH DAILY AT BEDTIME      flecainide (TAMBOCOR) 100 MG Tab Take 150 mg by mouth every 12 (twelve) hours.       ibuprofen 200 mg Cap Take 200 mg by mouth.      methylphenidate HCl (RITALIN) 10 MG tablet Take 1 and 1/2 tablets twice daily as needed. 90 tablet 0    methylphenidate HCl (RITALIN) 10 MG tablet Take 1 and 1/2 tablets twice daily as needed. 90 tablet 0    methylphenidate HCl (RITALIN) 10 MG tablet Take 1 and 1/2 tablets twice daily as needed. 60 tablet 0    methylphenidate HCl (RITALIN) 10 MG tablet Take 1 and 1/2 tablets twice daily as needed. 60 tablet 0    methylphenidate HCl (RITALIN) 10 MG tablet Take 1 and 1/2 tablets twice daily as needed. 60 tablet 0    oxybutynin (DITROPAN XL) 10 MG 24 hr tablet Take 15 mg by mouth.       sertraline (ZOLOFT) 100 MG tablet TAKE 1 AND 1/2 TABLETS DAILY. 135 tablet 0     No facility-administered encounter medications on file as of 2/13/2025.     Assessment - Diagnosis - Goals:     Impression: 70 y/o M with reportedly stable inattention, treated effectively with stimulants chronically. Has chronic mildly low moods, anergia. Didn't tolerate strattera; intuniv with modest effect but tolerable at starting doses. MOCA on follow-up was improved and in normal range. Depression  improved with antidepressant treatment, worsened with discontinuation, better again on treatment. chronic attentional complaints didn't improve with antidepressant treatment. Recent 30/30 MMSE at neuro assessment, no identifiable neuro illness. Cardiologist cleared use for stimulant. Attention improved with stimulant. Higher dose of both sertraline caused side effects    DX: MDD; inattention    Treatment Goals:  Specify outcomes written in observable, behavioral terms: improve moods by self     Treatment Plan/Recommendations:   Methylphenidate 10 mg bid. Sertraline 100 mg daily for depression.   Consider modafanil.    Discussed risks, benefits, and alternatives to treatment plan documented above with patient. I answered all patient questions related to this plan and patient expressed understanding and agreement.      Return to Clinic: 3 months    CHARLOTTE Langston MD  Psychiatry, [facility]

## 2025-04-08 NOTE — PROGRESS NOTES
"Outpatient Psychiatry Follow-up Visit (MD/NP)    10/10/2022    Donavan Castillo, a 67 y.o. male, presenting for follow-up visit. Met with patient.    Reason for Encounter: Patient complains of chronic inattention.     Interval hx: Patient seen and interviewed for follow-up, about four months since his last visit. No new illnesses or health changes since then. Had an ablation for a-fib. A-fib mostly resolved and has been able to reduce cardiac  medication since. another sleep study related to sleep apnea. More severe. Will do a-apap. No new mental health problems. Moods remain improved. Adherent to medication. Denies side effects.     Background: Pt is a 66 y/o M with previous reported treatment for adhd, treated with stimulants since his mid-40's. Diagnosed with adhd by psychiatrist Kamari Wilson, previously treated with stimulants for many years. Reports chronic inattention interferes were personal function.     Interferes with personal (marital conflict due to chronic problems listening, missing information from his wife) and occupational functioning (difficulty completing projects, being inefficient with his performance). Describes difficulty with multi-tasking, being easily distractible, not attending consistently through conversations. Thinks it may have gotten worse over past 10 years. Denies presence of problems with inattention during school-age years. Denies problems with anxiety, depression, generally, though frustrated with this particular problem. Participating in couples therapy.     Some days:   Low mood  Sleep problems  Self-reproach  Decreased interest     Most days:   Low appetite  Concentration problems    Nearly daily:   Anergia  Psychomotor slowing    PHQ-9 = 14    ASRS - 7 of 18 in clinical range    MOCA - 26 (point off for not completing one final edge of 3-d cube and 3 words not recalled on MOCA, though was able to recall 2/3 with cues).     Family Hx: "lots of eccentric people". Brother ( " Patient presented with complaints of abdominal pain, nausea, and vomiting  CTAP showing signs of acute uncomplicated pancreatitis as well as cholelithiasis without cholecystitis  Pain currently controlled with Oxycodone  MRCP without evidence of stones in CBD or cholangitis  CT abd/pelvis: Worsening acute pancreatitis  with hypoenhancement in the pancreatic body suspicious for pancreatic parenchymal necrosis.   Abdominal pain improved on exam     Plan:  General Surgery and GI following  Continue IV fluids 125 ml/hr  Continue pain med regimen(oxycodone 5 mg)  Plan to schedule tylenol and continue with oxycodone  Tolerating diet     "in ) - severe OCD.  of overdose (suspected intentional). Sister, 2 years younger, "a hoarder". "the rest of us are functional but eccentric".     Psych Hx:   first problems with attention/concentration 15 years ago. Causes problems with completing tasks. Mostly stable over that time. Sometimes "walk into a room and realize I've forgotten what I went for".     diagnosed about 15 years ago. Diagnosed by Dr. Wilson.     Ritalin - worked well - took for a couple of years. 5 mg qid  adderall - didn't tolerate (mood swings)  Stopped due to a-fib    Stopped due to atrial fibrillation - has had it on/off since , usually well-managed with medication, though has had cardioversion 2-3 times. Last cardioversion was in February and was about to consent to ablation, but got reluctant when COVID arrived.     Reports moods are sometimes low, never suicidal.   Never treated for depression.   No rona - "a low energy person my whole life"  AVH - none  No delusions  No psych hospitalizations  Briefly took an antidepressant in  - prescribed by cardiologist - stopped due to no perceived benefit.       Medical Hx:   HTN  A-fib - managed with fleccanide  BPH - takes finasteride  mobic for joint pain.     Social Hx: 5 siblings, brother who  in , sister with problem with hoarding, another sister, 2 brothers (one in , one in [place]). Oldest of the 6. Both parents were in the home as were his maternal grandparents. Grew up in [place]. Childhood was a happy. Denies serious maltreatment. No developmental problems. Denies problems with attention & concentration as a kid. 2nd in class in . Went to [employer], studied pre-med, but didn't get accepted. 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 Continues to work full-time.      at 22 x 17 years. She had bipolar disorder.  again at 43 to second wife. Marriage is " ""stefano". Attention problem contributes to problems ("I don't listen the way she wants me too").      Review Of Systems:     GENERAL:  No weight gain or loss  SKIN:  No rashes or lacerations  HEAD:  No headaches  CARDIOVASCULAR:  No tachycardia or chest pain  ABDOMEN:  No nausea, vomiting, pain, constipation or diarrhea  URINARY:  No frequency, dysuria or sexual dysfunction  ENDOCRINE:  No polydipsia, polyuria  MUSCULOSKELETAL:  No pain or stiffness of the joints  NEUROLOGIC:  No weakness, sensory changes, seizures, confusion, memory loss, tremor or other abnormal movements    Current Evaluation:     Nutritional Screening: Considering the patient's height and weight, medications, medical history and preferences, should a referral be made to the dietitian? no    Constitutional  Vitals:  Most recent vital signs, dated less than 90 days prior to this appointment, were not reviewed.       General:  unremarkable, age appropriate     Musculoskeletal  Muscle Strength/Tone:  no tremor, no tic   Gait & Station:  non-ataxic     Psychiatric  Appearance: casually dressed & groomed;   Behavior: calm,   Cooperation: cooperative with assessment  Speech: normal rate, volume, tone  Thought Process: linear, goal-directed  Thought Content: No suicidal or homicidal ideation; no delusions  Affect: normal range  Mood: euthymic  Perceptions: No auditory or visual hallucinations  Level of Consciousness: alert throughout interview  Insight: fair  Cognition: Oriented to person, place, time, & situation  Memory: no apparent deficits to general clinical interview; not formally assessed  Attention/Concentration: no apparent deficits to general clinical interview; not formally assessed  Fund of Knowledge: average by vocabulary/education    Laboratory Data  No visits with results within 1 Month(s) from this visit.   Latest known visit with results is:   No results found for any previous visit.       Medications  Outpatient Encounter Medications as " of 10/10/2022   Medication Sig Dispense Refill    aspirin 325 MG tablet Take 325 mg by mouth.      diltiaZEM (CARDIZEM CD) 240 MG 24 hr capsule TAKE 1 CAPSULE BY MOUTH DAILY AT BEDTIME      flecainide (TAMBOCOR) 100 MG Tab Take 150 mg by mouth every 12 (twelve) hours.       guanFACINE (INTUNIV ER) 2 mg Tb24 TAKE 2 MG DAILY AFTER FINISHING 1 MG BOTTLE. 90 tablet 0    ibuprofen 200 mg Cap Take 200 mg by mouth.      oxybutynin (DITROPAN XL) 10 MG 24 hr tablet Take 15 mg by mouth.       sertraline (ZOLOFT) 100 MG tablet TAKE 1 AND 1/2 TABLETS BY MOUTH DAILY 135 tablet 0     No facility-administered encounter medications on file as of 10/10/2022.     Assessment - Diagnosis - Goals:     Impression: 69 y/o M with reportedly stable inattention, treated effectively with stimulants chronically. Has chronic mildly low moods, anergia. Didn't tolerate strattera, intuniv with modest effect but tolerable at starting doses. MOCA on follow-up was improved and in normal range. Depression improved with antidepressant treatment, stable with maintenance.      DX: MDD; inattention    Treatment Goals:  Specify outcomes written in observable, behavioral terms: improve moods by self     Treatment Plan/Recommendations:   Continue sertraline for depression.   Supportive psychotherapy today. Consider regular psychotherapy.     Discussed risks, benefits, and alternatives to treatment plan documented above with patient. I answered all patient questions related to this plan and patient expressed understanding and agreement.      Return to Clinic: 4-6 months    C. Jeremiah Langston MD  Psychiatry  [facility]  [address] , [place] [address] [phone]

## 2025-05-01 DIAGNOSIS — R41.840 INATTENTION: ICD-10-CM

## 2025-05-01 RX ORDER — METHYLPHENIDATE HYDROCHLORIDE 10 MG/1
TABLET ORAL
Qty: 90 TABLET | Refills: 0 | Status: SHIPPED | OUTPATIENT
Start: 2025-05-01

## 2025-05-16 RX ORDER — SERTRALINE HYDROCHLORIDE 100 MG/1
150 TABLET, FILM COATED ORAL
Qty: 135 TABLET | Refills: 0 | Status: SHIPPED | OUTPATIENT
Start: 2025-05-16

## 2025-08-01 RX ORDER — SERTRALINE HYDROCHLORIDE 100 MG/1
100 TABLET, FILM COATED ORAL
Qty: 90 TABLET | Refills: 0 | Status: SHIPPED | OUTPATIENT
Start: 2025-08-01

## 2025-08-12 DIAGNOSIS — R41.840 INATTENTION: ICD-10-CM

## 2025-08-13 RX ORDER — METHYLPHENIDATE HYDROCHLORIDE 10 MG/1
TABLET ORAL
Qty: 90 TABLET | Refills: 0 | Status: SHIPPED | OUTPATIENT
Start: 2025-08-13